# Patient Record
Sex: FEMALE | Race: WHITE | NOT HISPANIC OR LATINO | Employment: OTHER | URBAN - NONMETROPOLITAN AREA
[De-identification: names, ages, dates, MRNs, and addresses within clinical notes are randomized per-mention and may not be internally consistent; named-entity substitution may affect disease eponyms.]

---

## 2019-09-23 ENCOUNTER — OFFICE VISIT (OUTPATIENT)
Dept: CARDIOLOGY | Facility: CLINIC | Age: 69
End: 2019-09-23

## 2019-09-23 VITALS
HEIGHT: 69 IN | SYSTOLIC BLOOD PRESSURE: 138 MMHG | BODY MASS INDEX: 22.51 KG/M2 | DIASTOLIC BLOOD PRESSURE: 72 MMHG | HEART RATE: 65 BPM | WEIGHT: 152 LBS

## 2019-09-23 DIAGNOSIS — I48.0 PAF (PAROXYSMAL ATRIAL FIBRILLATION) (HCC): Primary | ICD-10-CM

## 2019-09-23 DIAGNOSIS — E83.42 HYPOMAGNESEMIA: ICD-10-CM

## 2019-09-23 DIAGNOSIS — E78.00 HYPERCHOLESTEREMIA: ICD-10-CM

## 2019-09-23 DIAGNOSIS — R00.2 PALPITATIONS: ICD-10-CM

## 2019-09-23 DIAGNOSIS — R01.1 MURMUR, CARDIAC: ICD-10-CM

## 2019-09-23 DIAGNOSIS — R94.31 ABNORMAL EKG: ICD-10-CM

## 2019-09-23 PROCEDURE — 93000 ELECTROCARDIOGRAM COMPLETE: CPT | Performed by: INTERNAL MEDICINE

## 2019-09-23 PROCEDURE — 99204 OFFICE O/P NEW MOD 45 MIN: CPT | Performed by: INTERNAL MEDICINE

## 2019-09-23 RX ORDER — PROPAFENONE HYDROCHLORIDE 150 MG/1
150 TABLET, COATED ORAL AS NEEDED
Qty: 30 TABLET | Refills: 2 | Status: SHIPPED | OUTPATIENT
Start: 2019-09-23 | End: 2019-10-21 | Stop reason: SINTOL

## 2019-09-23 NOTE — PROGRESS NOTES
Chief Complaint   Patient presents with   • Establish Care     new onset AFib, 8/22/19, we have records from [facility] [place]   • Palpitations     has had palpitions for years but nothing significant like the episode that took her to ER. Feels the afib was brought on by increased stress, had also on medication for UTI   • Aspirin     does not take a daily aspirin   • Medication Problem     states she ran out of the Eliquis and metoprolol for about a week.    • Labs     we have labs from ER and from 2018.   • Caffine     was drinking 2 cups of coffee a day, since May switched to decaf.  Does admit to benging on chocolate at times.    • TSH     has always trended to the higher end of normal for TSH, has never taken thyroid medication.         CARDIAC COMPLAINTS  palpitations      Subjective   Gemma Cabello is a 69 y.o. female came in today for her initial cardiac evaluation.  She has history of palpitation for many years.  It occurs most of the time when she is under stress both physically or mentally.  It normally lasts for few seconds to few minutes and subsides on its own.  She was never evaluated for the same.  About a month ago she went to the emergency room at [facility] with palpitation.  She apparently had some UTI-like symptoms prior to that and was on antibiotics.  She was also taking medication for sinus.  On the day she felt the heart was racing and she felt dizzy, nausea and had increased diaphoresis.  She went to the emergency room where she was noted to be in atrial fibrillation with rapid ventricular response.  She was given IV Lopressor which did slow down her A. fib but did not convert.  The ER physician called me regarding that.  Suggest p.o. Lopressor and Eliquis and I was planning to see the next week itself.  She apparently was going to [place] and did not come for the consult till now.  She apparently felt extremely well after the IV Lopressor.  She did not have any more palpitation or  dizziness.  She has been taking the metoprolol and Eliquis for about few weeks and she apparently ran out of the medication about a week ago.  She also has been drinking about 2 cups of coffee a day which she is switched to decaf.  She does have episodes of eating a lot of chocolate at times.  Her lab work which was done in the emergency room showed normal electrolytes, , magnesium not checked, TSH was slightly elevated at 5.86.  She denies having any chest pain or dizziness at rest or on exertion.  She does have some occasional shortness of breath but mostly on moderate to significant exertion.  She does hiking and swimming without any problem.She is not a smoker and drinks occasional alcohol.  Her father had atrial fibrillation.  Regarding her labs done for year ago showed cholesterol of 200 with the LDL of 130.      History reviewed. No pertinent surgical history.    Current Outpatient Medications   Medication Sig Dispense Refill   • aspirin 81 MG tablet Take 1 tablet by mouth Daily. 30 tablet 11   • propafenone (RYTHMOL) 150 MG tablet Take 1 tablet by mouth As Needed (Palpitation). Take 4 tabs with Palpitation. 2 more after 30 minutes if palpitation persist 30 tablet 2     No current facility-administered medications for this visit.            ALLERGIES:  Levaquin [levofloxacin]; Iodine; Quinolones; Sulfa antibiotics; Formaldehyde; and Nickel    Past Medical History:   Diagnosis Date   • Abnormal ECG    • Asthma    • Atrial fibrillation (CMS/HCC)    • History of breast augmentation    • History of varicose vein stripping    • Seasonal allergies        Social History     Tobacco Use   Smoking Status Never Smoker   Smokeless Tobacco Never Used          Family History   Problem Relation Age of Onset   • Cancer Mother    • Alzheimer's disease Father    • Atrial fibrillation Father         daughter thinks he had afib, was on Coumadin   • No Known Problems Sister    • Heart murmur Brother    • Heart murmur  "Brother    • No Known Problems Son    • No Known Problems Daughter    • No Known Problems Daughter        Review of Systems   Constitution: Negative for decreased appetite and malaise/fatigue.   HENT: Negative for congestion and sore throat.    Eyes: Negative for blurred vision.   Cardiovascular: Positive for palpitations. Negative for chest pain.   Respiratory: Negative for shortness of breath and snoring.    Endocrine: Negative for cold intolerance and heat intolerance.   Hematologic/Lymphatic: Negative for adenopathy. Does not bruise/bleed easily.   Skin: Negative for itching, nail changes and skin cancer.   Musculoskeletal: Negative for arthritis and myalgias.   Gastrointestinal: Negative for abdominal pain, dysphagia and heartburn.   Genitourinary: Negative for bladder incontinence and frequency.   Neurological: Positive for dizziness. Negative for light-headedness, seizures and vertigo.   Psychiatric/Behavioral: Negative for altered mental status.   Allergic/Immunologic: Negative for environmental allergies and hives.       Diabetes- No  Thyroid- abnormal    Objective     /72 (BP Location: Right arm)   Pulse 65   Ht 174 cm (68.5\")   Wt 68.9 kg (152 lb)   BMI 22.78 kg/m²      Physical Exam   Constitutional: She is oriented to person, place, and time. She appears well-developed and well-nourished.   HENT:   Head: Normocephalic.   Nose: Nose normal.   Eyes: EOM are normal. Pupils are equal, round, and reactive to light.   Neck: Normal range of motion. Neck supple.   Cardiovascular: Normal rate, regular rhythm, S1 normal and S2 normal.   Murmur heard.  Pulmonary/Chest: Effort normal and breath sounds normal.   Abdominal: Soft. Bowel sounds are normal.   Musculoskeletal: Normal range of motion. She exhibits no edema.   Neurological: She is alert and oriented to person, place, and time.   Skin: Skin is warm and dry.   Psychiatric: She has a normal mood and affect.         ECG 12 Lead  Date/Time: 9/23/2019 " 1:05 PM  Performed by: Alexis Camargo MD  Authorized by: Alexis Camargo MD   Comparison: compared with previous ECG from 8/22/2019  Comparison to previous ECG: Not in A.Fib  Rhythm: sinus rhythm  Rate: normal  Q waves: V1 and V2    QRS axis: normal  Other findings: T wave abnormality    Clinical impression: non-specific ECG              Assessment/Plan   Patient's Body mass index is 22.78 kg/m². BMI is within normal parameters. No follow-up required..     Gemma was seen today for establish care, palpitations, aspirin, medication problem, labs, caffine and tsh.    Diagnoses and all orders for this visit:    PAF (paroxysmal atrial fibrillation) (CMS/HCC)  -     Treadmill Stress Test; Future  -     Adult Transthoracic Echo Complete W/ Cont if Necessary Per Protocol; Future  -     propafenone (RYTHMOL) 150 MG tablet; Take 1 tablet by mouth As Needed (Palpitation). Take 4 tabs with Palpitation. 2 more after 30 minutes if palpitation persist  -     TSH; Future    Palpitations  -     Treadmill Stress Test; Future  -     Adult Transthoracic Echo Complete W/ Cont if Necessary Per Protocol; Future  -     TSH; Future    Murmur, cardiac  -     Adult Transthoracic Echo Complete W/ Cont if Necessary Per Protocol; Future    Hypercholesteremia    Abnormal EKG  -     Treadmill Stress Test; Future    Hypomagnesemia  -     Magnesium; Future     At baseline her heart rate is stable.  Her blood pressure is normal.  Her EKG showed sinus rhythm with Q waves in the V1 and V2 appears to be old.  Nonspecific ST-T changes seen.  Her clinical examination reveals a BMI of 23. She has short systolic murmur at the mitral area.  She has normal peripheral pulse and no pedal edema.      I had a long discussion with her about the PAF.  Her chads score is only 2.  At this time I think continuing aspirin should be more than enough.  Her annual risk of stroke is about 2.3% and there is related risk reduction of 22%.  I talked to her about  medication to keep her in sinus.  At this time I do not want to put her on any beta-blockers because of her history of baseline bradycardia the EKG's in the past.  I talked to her about 1C antiarrhythmic medication which can be taken regularly or as needed.  She preferred to take as needed.  I gave a prescription for Rythmol 150 mg tablet.  She is advised to take 4 tablets at the time of palpitation not responding and take another 2 after 30 minutes if palpitation persist.  She is advised to take it at home and place where she can lie down.    Regarding the palpitation, I also schedule her to undergo an echocardiogram to rule out any structural heart disease and also evaluate the heart murmur.  I also schedule her to undergo a regular stress test to evaluate her functional status, chronotropic response, blood pressure response and rule out any stress-induced arrhythmia.      Regarding the hypercholesterolemia, I talked to her about stricter diet.  Recheck it again in 6 months.  If the LDL is still elevated, then she might benefit with a low-dose of statin.    EKG which was done today also has some nonspecific T wave changes.  I like to check her magnesium level to rule out hypomagnesemia.    Based on the results of these tests, and the response to her medication, further recommendations will be made.                 Electronically signed by Alexis Camargo MD September 23, 2019 12:46 PM

## 2019-10-01 ENCOUNTER — HOSPITAL ENCOUNTER (OUTPATIENT)
Dept: CARDIOLOGY | Facility: HOSPITAL | Age: 69
Discharge: HOME | End: 2019-10-01

## 2019-10-01 ENCOUNTER — LAB (OUTPATIENT)
Dept: LAB | Facility: HOSPITAL | Age: 69
End: 2019-10-01

## 2019-10-01 ENCOUNTER — HOSPITAL ENCOUNTER (OUTPATIENT)
Dept: CARDIOLOGY | Facility: HOSPITAL | Age: 69
Discharge: HOME | End: 2019-10-01
Admitting: INTERNAL MEDICINE

## 2019-10-01 DIAGNOSIS — R01.1 MURMUR, CARDIAC: ICD-10-CM

## 2019-10-01 DIAGNOSIS — R00.2 PALPITATIONS: ICD-10-CM

## 2019-10-01 DIAGNOSIS — R94.31 ABNORMAL EKG: ICD-10-CM

## 2019-10-01 DIAGNOSIS — I48.0 PAF (PAROXYSMAL ATRIAL FIBRILLATION) (HCC): ICD-10-CM

## 2019-10-01 DIAGNOSIS — E83.42 HYPOMAGNESEMIA: ICD-10-CM

## 2019-10-01 LAB
BH CV ECHO MEAS - ACS: 2.2 CM
BH CV ECHO MEAS - AI DEC SLOPE: 193.1 CM/SEC^2
BH CV ECHO MEAS - AI MAX PG: 33.3 MMHG
BH CV ECHO MEAS - AI MAX VEL: 288.4 CM/SEC
BH CV ECHO MEAS - AI P1/2T: 437.6 MSEC
BH CV ECHO MEAS - AO MEAN PG: 4.3 MMHG
BH CV ECHO MEAS - AO ROOT AREA (BSA CORRECTED): 2
BH CV ECHO MEAS - AO ROOT AREA: 10.3 CM^2
BH CV ECHO MEAS - AO ROOT DIAM: 3.6 CM
BH CV ECHO MEAS - AO V2 MEAN: 98.1 CM/SEC
BH CV ECHO MEAS - AO V2 VTI: 32.4 CM
BH CV ECHO MEAS - ASC AORTA: 3.8 CM
BH CV ECHO MEAS - BSA(HAYCOCK): 1.8 M^2
BH CV ECHO MEAS - BSA: 1.8 M^2
BH CV ECHO MEAS - BZI_BMI: 23.1 KILOGRAMS/M^2
BH CV ECHO MEAS - BZI_METRIC_HEIGHT: 172.7 CM
BH CV ECHO MEAS - BZI_METRIC_WEIGHT: 68.9 KG
BH CV ECHO MEAS - EDV(CUBED): 70.5 ML
BH CV ECHO MEAS - EDV(MOD-SP4): 63 ML
BH CV ECHO MEAS - EDV(TEICH): 75.6 ML
BH CV ECHO MEAS - EF(CUBED): 64.1 %
BH CV ECHO MEAS - EF(MOD-SP4): 63.5 %
BH CV ECHO MEAS - EF(TEICH): 56.1 %
BH CV ECHO MEAS - ESV(CUBED): 25.3 ML
BH CV ECHO MEAS - ESV(MOD-SP4): 23 ML
BH CV ECHO MEAS - ESV(TEICH): 33.2 ML
BH CV ECHO MEAS - FS: 29 %
BH CV ECHO MEAS - IVS/LVPW: 0.95
BH CV ECHO MEAS - IVSD: 1.2 CM
BH CV ECHO MEAS - LA DIMENSION: 3.4 CM
BH CV ECHO MEAS - LA/AO: 0.94
BH CV ECHO MEAS - LV DIASTOLIC VOL/BSA (35-75): 34.6 ML/M^2
BH CV ECHO MEAS - LV IVRT: 0.13 SEC
BH CV ECHO MEAS - LV MASS(C)D: 183.9 GRAMS
BH CV ECHO MEAS - LV MASS(C)DI: 101.1 GRAMS/M^2
BH CV ECHO MEAS - LV SYSTOLIC VOL/BSA (12-30): 12.6 ML/M^2
BH CV ECHO MEAS - LVIDD: 4.1 CM
BH CV ECHO MEAS - LVIDS: 2.9 CM
BH CV ECHO MEAS - LVLD AP4: 6.9 CM
BH CV ECHO MEAS - LVLS AP4: 5.2 CM
BH CV ECHO MEAS - LVOT AREA (M): 4.2 CM^2
BH CV ECHO MEAS - LVOT AREA: 4.2 CM^2
BH CV ECHO MEAS - LVOT DIAM: 2.3 CM
BH CV ECHO MEAS - LVPWD: 1.3 CM
BH CV ECHO MEAS - MV A MAX VEL: 49.9 CM/SEC
BH CV ECHO MEAS - MV DEC SLOPE: 245.9 CM/SEC^2
BH CV ECHO MEAS - MV E MAX VEL: 62.7 CM/SEC
BH CV ECHO MEAS - MV E/A: 1.3
BH CV ECHO MEAS - RAP SYSTOLE: 10 MMHG
BH CV ECHO MEAS - RVDD: 3.6 CM
BH CV ECHO MEAS - RVSP: 27.7 MMHG
BH CV ECHO MEAS - SI(AO): 184.2 ML/M^2
BH CV ECHO MEAS - SI(CUBED): 24.9 ML/M^2
BH CV ECHO MEAS - SI(MOD-SP4): 22 ML/M^2
BH CV ECHO MEAS - SI(TEICH): 23.3 ML/M^2
BH CV ECHO MEAS - SV(AO): 335 ML
BH CV ECHO MEAS - SV(CUBED): 45.2 ML
BH CV ECHO MEAS - SV(MOD-SP4): 40 ML
BH CV ECHO MEAS - SV(TEICH): 42.4 ML
BH CV ECHO MEAS - TR MAX VEL: 210.4 CM/SEC
BH CV STRESS BP STAGE 1: NORMAL
BH CV STRESS BP STAGE 2: NORMAL
BH CV STRESS BP STAGE 3: NORMAL
BH CV STRESS DURATION MIN STAGE 1: 3
BH CV STRESS DURATION MIN STAGE 2: 3
BH CV STRESS DURATION MIN STAGE 3: 3
BH CV STRESS DURATION SEC STAGE 1: 0
BH CV STRESS DURATION SEC STAGE 2: 0
BH CV STRESS DURATION SEC STAGE 3: 0
BH CV STRESS GRADE STAGE 1: 10
BH CV STRESS GRADE STAGE 2: 12
BH CV STRESS GRADE STAGE 3: 14
BH CV STRESS HR STAGE 1: 100
BH CV STRESS HR STAGE 2: 120
BH CV STRESS HR STAGE 3: 136
BH CV STRESS METS STAGE 1: 5
BH CV STRESS METS STAGE 2: 7.5
BH CV STRESS METS STAGE 3: 10
BH CV STRESS PROTOCOL 1: NORMAL
BH CV STRESS RECOVERY BP: NORMAL MMHG
BH CV STRESS RECOVERY HR: 87 BPM
BH CV STRESS SPEED STAGE 1: 1.7
BH CV STRESS SPEED STAGE 2: 2.5
BH CV STRESS SPEED STAGE 3: 3.4
BH CV STRESS STAGE 1: 1
BH CV STRESS STAGE 2: 2
BH CV STRESS STAGE 3: 3
MAGNESIUM SERPL-MCNC: 1.9 MG/DL (ref 1.6–2.4)
MAXIMAL PREDICTED HEART RATE: 152 BPM
MAXIMAL PREDICTED HEART RATE: 152 BPM
PERCENT MAX PREDICTED HR: 89.47 %
STRESS BASELINE BP: NORMAL MMHG
STRESS BASELINE HR: 82 BPM
STRESS PERCENT HR: 105 %
STRESS POST ESTIMATED WORKLOAD: 10.1 METS
STRESS POST EXERCISE DUR MIN: 7 MIN
STRESS POST PEAK BP: NORMAL MMHG
STRESS POST PEAK HR: 136 BPM
STRESS TARGET HR: 129 BPM
STRESS TARGET HR: 129 BPM
TSH SERPL DL<=0.05 MIU/L-ACNC: 3.85 UIU/ML (ref 0.27–4.2)

## 2019-10-01 PROCEDURE — 93017 CV STRESS TEST TRACING ONLY: CPT

## 2019-10-01 PROCEDURE — 84443 ASSAY THYROID STIM HORMONE: CPT | Performed by: INTERNAL MEDICINE

## 2019-10-01 PROCEDURE — 36415 COLL VENOUS BLD VENIPUNCTURE: CPT

## 2019-10-01 PROCEDURE — 93306 TTE W/DOPPLER COMPLETE: CPT | Performed by: INTERNAL MEDICINE

## 2019-10-01 PROCEDURE — 83735 ASSAY OF MAGNESIUM: CPT | Performed by: INTERNAL MEDICINE

## 2019-10-01 PROCEDURE — 93018 CV STRESS TEST I&R ONLY: CPT | Performed by: INTERNAL MEDICINE

## 2019-10-01 PROCEDURE — 93306 TTE W/DOPPLER COMPLETE: CPT

## 2019-10-14 ENCOUNTER — TELEPHONE (OUTPATIENT)
Dept: CARDIOLOGY | Facility: CLINIC | Age: 69
End: 2019-10-14

## 2019-10-14 NOTE — TELEPHONE ENCOUNTER
Last Wednesday evening patient had episode of fast HR of 145-150.  She had just got home from driving from [place].  She had been at [facility] [place] and ate italian dinner, no alcohol.  She reports she had not drank water like she usually does that day.    She took the Rythmol 150 mg 4 tablets and 30 minutes later HR was still around 150 BPM.  Then she took the additional Rythmol 150 mg 2 tablets.  She laid down and about 2 hours after taking the Rythmol she woke up violently sick. Then for about 4 hours she had severe vomiting, diarrhea.  She passed out 3 different times.  Her  was there with her, and they did not seek medical attention.  The next day, she reports her HR was better and regular.  She hydrated her self and has done fine since then.

## 2019-10-16 NOTE — TELEPHONE ENCOUNTER
Patient aware to not take Rythmol.  I advised patient if she has more problems to call our office.  Advised her if she has episode of A. Fib and fast HR to go to ER for treatment.  Patient reports she is now taking aspirin daily.

## 2019-10-21 ENCOUNTER — TELEPHONE (OUTPATIENT)
Dept: CARDIOLOGY | Facility: CLINIC | Age: 69
End: 2019-10-21

## 2019-10-21 DIAGNOSIS — I48.0 PAF (PAROXYSMAL ATRIAL FIBRILLATION) (HCC): Primary | ICD-10-CM

## 2019-10-21 NOTE — TELEPHONE ENCOUNTER
Patient aware of recommendations.  She does want to proceed with starting Multaq 400 mg BID with food and EP referral.  She request Dr. Duran.  Patient picking up Multaq 400 mg BID #24 samples today.  Script also sent to [facility] to check coverage.

## 2019-10-21 NOTE — TELEPHONE ENCOUNTER
Last Friday around mid morning patient had episode of palpitations that lasted 4 hours with HR up to 200 BPM.  Patient reports it was A. Fib, and her HR fluctuated from 118-200 BPM.  She did not seek medical attention.  After the 4 hours, patient reports she felt the episode end suddenly and immediately felt better.  Throughout the weekend off and on, HR fluctuated  BPM.

## 2019-10-24 ENCOUNTER — CLINICAL SUPPORT (OUTPATIENT)
Dept: CARDIOLOGY | Facility: CLINIC | Age: 69
End: 2019-10-24

## 2019-10-24 ENCOUNTER — TELEPHONE (OUTPATIENT)
Dept: CARDIOLOGY | Facility: CLINIC | Age: 69
End: 2019-10-24

## 2019-10-24 DIAGNOSIS — R00.2 PALPITATIONS: Primary | ICD-10-CM

## 2019-10-24 PROCEDURE — 93000 ELECTROCARDIOGRAM COMPLETE: CPT | Performed by: INTERNAL MEDICINE

## 2019-10-24 NOTE — PROGRESS NOTES
Procedure     ECG 12 Lead  Date/Time: 10/24/2019 6:24 PM  Performed by: Alexis Camargo MD  Authorized by: Alexis Camargo MD   Comparison: compared with previous ECG from 9/23/2019  Similar to previous ECG  Rhythm: sinus rhythm  Rate: normal  QRS axis: normal  Other findings: T wave abnormality    Clinical impression: abnormal EKG

## 2019-10-24 NOTE — TELEPHONE ENCOUNTER
Patient is aware to stop Multaq and that she will receive  call from Dr. Hoover office for EP consult.

## 2019-10-24 NOTE — TELEPHONE ENCOUNTER
"Patient here this afternoon for EKG. Multaq 400 mg bid started 10/21/19.    /64     Patient reported that she is concerned she may have a blood clot as her left knee is swollen and having pain in the back of her knee. Also faint dizziness and chest pressure, headache-left side.   She has been nauseous 12 hrs after starting Multaq and knee swelling,  hot flash then chills;\"wash of tingling in back of neck down back of arms(\"weightless feeling in shoulders/back of arms\") Even when HR is 68 she feels her heart is pounding hard.    She said she has some discomfort with walking, but did have a good day as she went bowling.    PA FOR MULTAQ DENIED BY INSURANCE , MUST TRY PACERONE (AMIODARONE) 200 MG UNLESS THERE IS A MEDICAL REASON SHE MUST HAVE MULTAQ    DOSE/DIRECTIONS  PLEASE IF CHANGED   "

## 2019-12-17 ENCOUNTER — CONSULT (OUTPATIENT)
Dept: CARDIOLOGY | Facility: CLINIC | Age: 69
End: 2019-12-17

## 2019-12-17 VITALS
HEART RATE: 66 BPM | SYSTOLIC BLOOD PRESSURE: 118 MMHG | WEIGHT: 155.6 LBS | DIASTOLIC BLOOD PRESSURE: 70 MMHG | OXYGEN SATURATION: 98 % | BODY MASS INDEX: 23.58 KG/M2 | HEIGHT: 68 IN

## 2019-12-17 DIAGNOSIS — I48.0 PAF (PAROXYSMAL ATRIAL FIBRILLATION) (HCC): Primary | ICD-10-CM

## 2019-12-17 PROCEDURE — 99204 OFFICE O/P NEW MOD 45 MIN: CPT | Performed by: INTERNAL MEDICINE

## 2019-12-17 PROCEDURE — 93000 ELECTROCARDIOGRAM COMPLETE: CPT | Performed by: INTERNAL MEDICINE

## 2019-12-17 NOTE — PROGRESS NOTES
"Gemma Cabello  1950  PCP: Yenifer Hope APRN    SUBJECTIVE:   Gemma Cabello is a 69 y.o. female seen for a consultation visit regarding the following:     Chief Complaint:   Chief Complaint   Patient presents with   • Irregular Heart Beat     Consult          Consultation is requested by Alexis Camargo MD for evaluation of Irregular Heart Beat (Consult)        History:  This is a 69-year-old patient referred by Dr. Camargo for further evaluation and management of atrial fibrillation.  The patient had her first episode atrial fibrillation August 2019 when she presented to an outside hospital with palpitations.  She is found to be in atrial fibrillation with stable rates.  She was started on Eliquis and metoprolol therapy and converted on her own.  She went on to have 2 more major episodes of atrial fibrillation.  She was attempted on Propafenone therapy but developed severe side effects from the medication.  She was then switched to Multaq therapy but also developed side effects and had to be discontinued.  She does have a history of palpitations that is been occurring off and on for many years.  She continues to have occasional episodes of palpitations.      Cardiac PMH: (Old records have been reviewed and summarized below)  1.  Atrial fibrillation-diagnosed August 2019-unable to tolerate Propafenone or Multaq therapy  2.  Aortic valve regurgitation-mild to moderate    Past Medical History, Past Surgical History, Family history, Social History, and Medications were all reviewed with the patient today and updated as necessary.     No current outpatient medications on file.    Allergies   Allergen Reactions   • Levaquin [Levofloxacin] Shortness Of Breath and Swelling   • Multaq [Dronedarone] Shortness Of Breath, Rash and Dizziness   • Rythmol [Propafenone Hcl] Nausea And Vomiting, Other (See Comments) and Confusion     Syncope   • Formaldehyde Rash   • Iodine Other (See Comments)     \"open sores\"   • " Nickel Rash   • Propafenone Nausea And Vomiting   • Quinolones Nausea And Vomiting   • Sulfa Antibiotics Nausea And Vomiting   • Zyrtec [Cetirizine] Nausea And Vomiting         Past Medical History:   Diagnosis Date   • Abnormal ECG    • Abnormal heart rhythm    • Asthma    • Atrial fibrillation (CMS/HCC)    • History of breast augmentation    • History of heart attack    • History of varicose vein stripping    • Seasonal allergies      Past Surgical History:   Procedure Laterality Date   • BREAST AUGMENTATION     • BREAST RECONSTRUCTION     • BREAST SURGERY     • CARDIOVASCULAR STRESS TEST  10/01/2019    R.Stress- 7 Min, 10.1 METS. 89% THR. BP- 129/78. Rare PVC. Negative.   • ECHO - CONVERTED  10/01/2019    EF 60%. Mild- Mod AI. Trace MR. RVSP- 28 mmHg.   • TUBAL ABDOMINAL LIGATION       Family History   Problem Relation Age of Onset   • Cancer Mother    • Alzheimer's disease Father    • Atrial fibrillation Father         daughter thinks he had afib, was on Coumadin   • No Known Problems Sister    • Heart murmur Brother    • Heart murmur Brother    • No Known Problems Son    • No Known Problems Daughter    • No Known Problems Daughter      Social History     Tobacco Use   • Smoking status: Never Smoker   • Smokeless tobacco: Never Used   Substance Use Topics   • Alcohol use: Yes     Alcohol/week: 3.0 standard drinks     Types: 3 Shots of liquor per week       ROS:    General: no recent weight loss/gain, weakness or fatigue  Skin: no rashes, lumps, or other skin changes  HEENT: no dizziness, lightheadedness, or vision changes  Respiratory: no cough or hemoptysis  Cardiovascular: + palpitations, and tachycardia  Gastrointestinal: no black/tarry stools or diarrhea  Urinary: no change in frequency or urgency  Peripheral Vascular: no claudication or leg cramps  Musculoskeletal: no muscle or joint pain/stiffness  Psychiatric: no depression or excessive stress  Neurological: no sensory or motor loss, no  "syncope  Hematologic: no anemia, easy bruising or bleeding  Endocrine: no thyroid problems, nor heat or cold intolerance       PHYSICAL EXAM:   /70 (BP Location: Left arm, Patient Position: Sitting)   Pulse 66   Ht 172.7 cm (68\")   Wt 70.6 kg (155 lb 9.6 oz)   SpO2 98%   BMI 23.66 kg/m²      Wt Readings from Last 5 Encounters:   12/17/19 70.6 kg (155 lb 9.6 oz)   09/23/19 68.9 kg (152 lb)     BP Readings from Last 5 Encounters:   12/17/19 118/70   09/23/19 138/72       General-Well Nourished, Well developed  Eyes - PERRLA  Neck- supple, No mass  CV- regular rate and rhythm, no MRG  Lung- clear bilaterally  Abd- soft, +BS  Musc/skel - Norm strength and range of motion  Skin- warm and dry  Neuro - Alert & Oriented x 3, appropriate mood.    Medical problems and test results were reviewed with the patient today.     Results for orders placed or performed during the hospital encounter of 10/01/19   Adult Transthoracic Echo Complete W/ Cont if Necessary Per Protocol   Result Value Ref Range    BSA 1.8 m^2    BH CV ECHO JASS - RVDD 3.6 cm    IVSd 1.2 cm    LVIDd 4.1 cm    LVIDs 2.9 cm    LVPWd 1.3 cm    IVS/LVPW 0.95     FS 29.0 %    EDV(Teich) 75.6 ml    ESV(Teich) 33.2 ml    EF(Teich) 56.1 %    EDV(cubed) 70.5 ml    ESV(cubed) 25.3 ml    EF(cubed) 64.1 %    LV mass(C)d 183.9 grams    LV mass(C)dI 101.1 grams/m^2    SV(Teich) 42.4 ml    SI(Teich) 23.3 ml/m^2    SV(cubed) 45.2 ml    SI(cubed) 24.9 ml/m^2    Ao root diam 3.6 cm    Ao root area 10.3 cm^2    ACS 2.2 cm    LA dimension 3.4 cm    asc Aorta Diam 3.8 cm    LA/Ao 0.94     LVOT diam 2.3 cm    LVOT area 4.2 cm^2    LVOT area(traced) 4.2 cm^2    LVLd ap4 6.9 cm    EDV(MOD-sp4) 63.0 ml    LVLs ap4 5.2 cm    ESV(MOD-sp4) 23.0 ml    EF(MOD-sp4) 63.5 %    SV(MOD-sp4) 40.0 ml    SI(MOD-sp4) 22.0 ml/m^2    Ao root area (BSA corrected) 2.0     LV [place] Vol (BSA corrected) 34.6 ml/m^2    LV Sys Vol (BSA corrected) 12.6 ml/m^2    MV E max chino 62.7 cm/sec    MV A max " chino 49.9 cm/sec    MV E/A 1.3     LV IVRT 0.13 sec    MV dec slope 245.9 cm/sec^2    Ao V2 mean 98.1 cm/sec    Ao mean PG 4.3 mmHg    Ao V2 VTI 32.4 cm    AI max chino 288.4 cm/sec    AI max PG 33.3 mmHg    AI dec slope 193.1 cm/sec^2    AI P1/2t 437.6 msec    SV(Ao) 335.0 ml    SI(Ao) 184.2 ml/m^2    TR max chino 210.4 cm/sec    RVSP(TR) 27.7 mmHg    RAP systole 10.0 mmHg     CV ECHO JASS - BZI_BMI 23.1 kilograms/m^2     CV ECHO JASS - BSA(HAYCOCK) 1.8 m^2     CV ECHO JASS - BZI_METRIC_WEIGHT 68.9 kg     CV ECHO JASS - BZI_METRIC_HEIGHT 172.7 cm    Target HR (85%) 129 bpm    Max. Pred. HR (100%) 152 bpm         No results found for: CHOL, HDL, HDLC, LDL, LDLC, VLDL    EKG:  (EKG/Tracing has been independently visualized by me and summarized below)      ECG 12 Lead  Date/Time: 12/17/2019 12:28 PM  Performed by: Len Duran MD  Authorized by: Len Duran MD   Comparison: compared with previous ECG   Similar to previous ECG  Rhythm: sinus rhythm  Rate: normal  BPM: 60  Other: no other findings    Clinical impression: normal ECG            ASSESSMENT and PLAN  1.  Atrial fibrillation-has failed Propafenone and Multaq therapy.  We discussed with her options including catheter-based ablation.  She has concerns about any further therapy including medical versus ablation.  She also think about this in more detail.  Before proceeding forward with any further therapy.  She will use metoprolol as needed.  2.  Stroke prophylaxis-we reported that per the guidelines anti-coagulation is recommended.  She has concerns about taking Eliquis long-term and therefore use aspirin on a daily basis.    Return in about 3 months (around 3/17/2020).            Len Duran M.D., F.A.C.C, F.H.R.S.  Cardiology/Electrophysiology  12/17/19  12:28 PM

## 2020-03-20 ENCOUNTER — OFFICE VISIT (OUTPATIENT)
Dept: CARDIOLOGY | Facility: CLINIC | Age: 70
End: 2020-03-20

## 2020-03-20 VITALS
HEIGHT: 68 IN | BODY MASS INDEX: 22.58 KG/M2 | WEIGHT: 149 LBS | HEART RATE: 74 BPM | OXYGEN SATURATION: 98 % | SYSTOLIC BLOOD PRESSURE: 104 MMHG | DIASTOLIC BLOOD PRESSURE: 64 MMHG

## 2020-03-20 DIAGNOSIS — I48.0 PAF (PAROXYSMAL ATRIAL FIBRILLATION) (HCC): Primary | ICD-10-CM

## 2020-03-20 PROCEDURE — 99213 OFFICE O/P EST LOW 20 MIN: CPT | Performed by: INTERNAL MEDICINE

## 2020-03-20 RX ORDER — LORATADINE 10 MG/1
CAPSULE, LIQUID FILLED ORAL AS NEEDED
COMMUNITY
End: 2023-02-17

## 2020-03-20 NOTE — PROGRESS NOTES
"Gemma Cabello  1950  PCP: Yenifer Hope APRN    SUBJECTIVE:   Gemma Cabello is a 69 y.o. female seen for a consultation visit regarding the following:     Chief Complaint:   Chief Complaint   Patient presents with   • Atrial Fibrillation        HPI:  Patient has been cardiac stable. No Palp or A. Fib.     History:  This is a 69-year-old patient referred by Dr. Camargo for further evaluation and management of atrial fibrillation.  The patient had her first episode atrial fibrillation August 2019 when she presented to an outside hospital with palpitations.  She is found to be in atrial fibrillation with stable rates.  She was started on Eliquis and metoprolol therapy and converted on her own.  She went on to have 2 more major episodes of atrial fibrillation.  She was attempted on Propafenone therapy but developed severe side effects from the medication.  She was then switched to Multaq therapy but also developed side effects and had to be discontinued.  She does have a history of palpitations that is been occurring off and on for many years.  She continues to have occasional episodes of palpitations.      Cardiac PMH: (Old records have been reviewed and summarized below)  1.  Atrial fibrillation-diagnosed August 2019-unable to tolerate Propafenone or Multaq therapy  2.  Aortic valve regurgitation-mild to moderate    Past Medical History, Past Surgical History, Family history, Social History, and Medications were all reviewed with the patient today and updated as necessary.       Current Outpatient Medications:   •  Loratadine (Claritin) 10 MG capsule, Take  by mouth Daily., Disp: , Rfl:     Allergies   Allergen Reactions   • Levaquin [Levofloxacin] Shortness Of Breath and Swelling   • Multaq [Dronedarone] Shortness Of Breath, Rash and Dizziness   • Rythmol [Propafenone Hcl] Nausea And Vomiting, Other (See Comments) and Confusion     Syncope   • Formaldehyde Rash   • Iodine Other (See Comments)     \"open " "sores\"   • Nickel Rash   • Propafenone Nausea And Vomiting   • Quinolones Nausea And Vomiting   • Sulfa Antibiotics Nausea And Vomiting   • Zyrtec [Cetirizine] Nausea And Vomiting         Past Medical History:   Diagnosis Date   • Abnormal ECG    • Abnormal heart rhythm    • Asthma    • Atrial fibrillation (CMS/HCC)    • History of breast augmentation    • History of heart attack    • History of varicose vein stripping    • Seasonal allergies      Past Surgical History:   Procedure Laterality Date   • BREAST AUGMENTATION     • BREAST RECONSTRUCTION     • BREAST SURGERY     • CARDIOVASCULAR STRESS TEST  10/01/2019    R.Stress- 7 Min, 10.1 METS. 89% THR. BP- 129/78. Rare PVC. Negative.   • ECHO - CONVERTED  10/01/2019    EF 60%. Mild- Mod AI. Trace MR. RVSP- 28 mmHg.   • TUBAL ABDOMINAL LIGATION       Family History   Problem Relation Age of Onset   • Cancer Mother    • Alzheimer's disease Father    • Atrial fibrillation Father         daughter thinks he had afib, was on Coumadin   • No Known Problems Sister    • Heart murmur Brother    • Heart murmur Brother    • No Known Problems Son    • No Known Problems Daughter    • No Known Problems Daughter      Social History     Tobacco Use   • Smoking status: Never Smoker   • Smokeless tobacco: Never Used   Substance Use Topics   • Alcohol use: Yes     Alcohol/week: 3.0 standard drinks     Types: 3 Shots of liquor per week            PHYSICAL EXAM:   /64 (BP Location: Left arm, Patient Position: Sitting)   Pulse 74   Ht 172.7 cm (68\")   Wt 67.6 kg (149 lb)   SpO2 98%   BMI 22.66 kg/m²      Wt Readings from Last 5 Encounters:   03/20/20 67.6 kg (149 lb)   12/17/19 70.6 kg (155 lb 9.6 oz)   09/23/19 68.9 kg (152 lb)     BP Readings from Last 5 Encounters:   03/20/20 104/64   12/17/19 118/70   09/23/19 138/72       General-Well Nourished, Well developed  Eyes - PERRLA  Neck- supple, No mass  CV- regular rate and rhythm, no MRG  Lung- clear bilaterally  Abd- soft, " +BS  Musc/sk - [facility] strength and range of motion  Skin- warm and dry  Neuro - Alert & Oriented x 3, appropriate mood.    Medical problems and test results were reviewed with the patient today.     Results for orders placed or performed during the hospital encounter of 10/01/19   Adult Transthoracic Echo Complete W/ Cont if Necessary Per Protocol   Result Value Ref Range    BSA 1.8 m^2    RVIDd 3.6 cm    IVSd 1.2 cm    LVIDd 4.1 cm    LVIDs 2.9 cm    LVPWd 1.3 cm    IVS/LVPW 0.95     FS 29.0 %    EDV(Teich) 75.6 ml    ESV(Teich) 33.2 ml    EF(Teich) 56.1 %    EDV(cubed) 70.5 ml    ESV(cubed) 25.3 ml    EF(cubed) 64.1 %    LV mass(C)d 183.9 grams    LV mass(C)dI 101.1 grams/m^2    SV(Teich) 42.4 ml    SI(Teich) 23.3 ml/m^2    SV(cubed) 45.2 ml    SI(cubed) 24.9 ml/m^2    Ao root diam 3.6 cm    Ao root area 10.3 cm^2    ACS 2.2 cm    LA dimension 3.4 cm    asc Aorta Diam 3.8 cm    LA/Ao 0.94     LVOT diam 2.3 cm    LVOT area 4.2 cm^2    LVOT area(traced) 4.2 cm^2    LVLd ap4 6.9 cm    EDV(MOD-sp4) 63.0 ml    LVLs ap4 5.2 cm    ESV(MOD-sp4) 23.0 ml    EF(MOD-sp4) 63.5 %    SV(MOD-sp4) 40.0 ml    SI(MOD-sp4) 22.0 ml/m^2    Ao root area (BSA corrected) 2.0     LV [place] Vol (BSA corrected) 34.6 ml/m^2    LV Sys Vol (BSA corrected) 12.6 ml/m^2    MV E max chino 62.7 cm/sec    MV A max chino 49.9 cm/sec    MV E/A 1.3     LV IVRT 0.13 sec    MV dec slope 245.9 cm/sec^2    Ao V2 mean 98.1 cm/sec    Ao mean PG 4.3 mmHg    Ao V2 VTI 32.4 cm    AI max chino 288.4 cm/sec    AI max PG 33.3 mmHg    AI dec slope 193.1 cm/sec^2    AI P1/2t 437.6 msec    SV(Ao) 335.0 ml    SI(Ao) 184.2 ml/m^2    TR max chino 210.4 cm/sec    RVSP(TR) 27.7 mmHg    RAP systole 10.0 mmHg     CV ECHO JASS - BZI_BMI 23.1 kilograms/m^2     CV ECHO JASS - BSA([facility]) 1.8 m^2     CV ECHO JASS - BZI_METRIC_WEIGHT 68.9 kg     CV ECHO JASS - BZI_METRIC_HEIGHT 172.7 cm    Target HR (85%) 129 bpm    Max. Pred. HR (100%) 152 bpm         No results found for: CHOL,  HDL, HDLC, LDL, LDLC, VLDL    EKG:  (EKG/Tracing has been independently visualized by me and summarized below)    Procedures    ASSESSMENT and PLAN  1.  Atrial fibrillation-has failed Propafenone and Multaq therapy.  We discussed with her options including catheter-based ablation.  She has concerns about any further therapy including medical versus ablation. She wants to wait on any further therapy.  She will use metoprolol as needed.  2.  Stroke prophylaxis-we reported that per the guidelines anti-coagulation is recommended.  She has concerns about taking Eliquis long-term and therefore use aspirin on a daily basis.    Return in about 6 months (around 9/20/2020).            Len Duran M.D., F.A.C.C, F.H.R.S.  Cardiology/Electrophysiology  03/20/20  12:12

## 2020-09-11 ENCOUNTER — TELEPHONE (OUTPATIENT)
Dept: CARDIOLOGY | Facility: CLINIC | Age: 70
End: 2020-09-11

## 2020-09-11 NOTE — TELEPHONE ENCOUNTER
Received fax from Dr. Ritchie Dove for cardiac clearance for patient to have a capsulorrhaphy IMF resection and fat transfer to left breast under 1 hour of general anesthesia in an office based facility.     According to the clearance request patient is not taking any blood thinners including aspirin.     According to our records, I do not see where patient has had any stenting. Patient has history of PAF. Patient's last office visit with us was on 09/23/19. Patient has been seen by Dr. Duran's office.      Fax [phone]    Attn: Maura or Estrellita

## 2020-11-20 ENCOUNTER — OFFICE VISIT (OUTPATIENT)
Dept: CARDIOLOGY | Facility: CLINIC | Age: 70
End: 2020-11-20

## 2020-11-20 VITALS
OXYGEN SATURATION: 98 % | HEIGHT: 68 IN | HEART RATE: 83 BPM | BODY MASS INDEX: 21.37 KG/M2 | SYSTOLIC BLOOD PRESSURE: 120 MMHG | WEIGHT: 141 LBS | DIASTOLIC BLOOD PRESSURE: 64 MMHG

## 2020-11-20 DIAGNOSIS — I48.0 PAF (PAROXYSMAL ATRIAL FIBRILLATION) (HCC): Primary | ICD-10-CM

## 2020-11-20 PROCEDURE — 99213 OFFICE O/P EST LOW 20 MIN: CPT | Performed by: INTERNAL MEDICINE

## 2020-11-20 RX ORDER — KETOROLAC TROMETHAMINE 4 MG/ML
1 SOLUTION/ DROPS OPHTHALMIC AS NEEDED
COMMUNITY
End: 2023-02-17

## 2020-11-20 NOTE — PROGRESS NOTES
Gemma Cabello  1950  PCP: Yenifer Hope APRN    SUBJECTIVE:   Gemma Cabello is a 70 y.o. female seen for a consultation visit regarding the following:     Chief Complaint:   Chief Complaint   Patient presents with   • Atrial Fibrillation        HPI:  Patient has been cardiac stable. No Palp or A. Fib. She has stopped meds and is concerned about taking meds.     History:  This is a 70-year-old patient referred by Dr. Camargo for further evaluation and management of atrial fibrillation.  The patient had her first episode atrial fibrillation August 2019 when she presented to an outside hospital with palpitations.  She is found to be in atrial fibrillation with stable rates.  She was started on Eliquis and metoprolol therapy and converted on her own.  She went on to have 2 more major episodes of atrial fibrillation.  She was attempted on Propafenone therapy but developed severe side effects from the medication.  She was then switched to Multaq therapy but also developed side effects and had to be discontinued.  She does have a history of palpitations that is been occurring off and on for many years.  She continues to have occasional episodes of palpitations.      Cardiac PMH: (Old records have been reviewed and summarized below)  1.  Atrial fibrillation-diagnosed August 2019-unable to tolerate Propafenone or Multaq therapy  2.  Aortic valve regurgitation-mild to moderate    Past Medical History, Past Surgical History, Family history, Social History, and Medications were all reviewed with the patient today and updated as necessary.       Current Outpatient Medications:   •  ketorolac (ACULAR) 0.4 % solution, Administer 1 drop into the left eye 4 (Four) Times a Day., Disp: , Rfl:   •  Loratadine (Claritin) 10 MG capsule, Take  by mouth Daily., Disp: , Rfl:     Allergies   Allergen Reactions   • Levaquin [Levofloxacin] Shortness Of Breath and Swelling   • Multaq [Dronedarone] Shortness Of Breath, Rash and  "Dizziness   • Rythmol [Propafenone Hcl] Nausea And Vomiting, Other (See Comments) and Confusion     Syncope   • Formaldehyde Rash   • Iodine Other (See Comments)     \"open sores\"   • Nickel Rash   • Propafenone Nausea And Vomiting   • Quinolones Nausea And Vomiting   • Sulfa Antibiotics Nausea And Vomiting   • Zyrtec [Cetirizine] Nausea And Vomiting         Past Medical History:   Diagnosis Date   • Abnormal ECG    • Abnormal heart rhythm    • Asthma    • Atrial fibrillation (CMS/HCC)    • History of breast augmentation    • History of heart attack    • History of varicose vein stripping    • Seasonal allergies      Past Surgical History:   Procedure Laterality Date   • BREAST AUGMENTATION     • BREAST RECONSTRUCTION     • BREAST SURGERY     • CARDIOVASCULAR STRESS TEST  10/01/2019    R.Stress- 7 Min, 10.1 METS. 89% THR. BP- 129/78. Rare PVC. Negative.   • CATARACT EXTRACTION      left   • ECHO - CONVERTED  10/01/2019    EF 60%. Mild- Mod AI. Trace  RVSP- 28 mmHg.   • TUBAL ABDOMINAL LIGATION       Family History   Problem Relation Age of Onset   • Cancer Mother    • Alzheimer's disease Father    • Atrial fibrillation Father         daughter thinks he had afib, was on Coumadin   • No Known Problems Sister    • Heart murmur Brother    • Heart murmur Brother    • No Known Problems Son    • No Known Problems Daughter    • No Known Problems Daughter      Social History     Tobacco Use   • Smoking status: Never Smoker   • Smokeless tobacco: Never Used   Substance Use Topics   • Alcohol use: Yes     Alcohol/week: 3.0 standard drinks     Types: 3 Shots of liquor per week            PHYSICAL EXAM:   /64 (BP Location: Left arm, Patient Position: Sitting)   Pulse 83   Ht 172.7 cm (68\")   Wt 64 kg (141 lb)   SpO2 98%   BMI 21.44 kg/m²      Wt Readings from Last 5 Encounters:   11/20/20 64 kg (141 lb)   03/20/20 67.6 kg (149 lb)   12/17/19 70.6 kg (155 lb 9.6 oz)   09/23/19 68.9 kg (152 lb)     BP Readings from " Last 5 Encounters:   11/20/20 120/64   03/20/20 104/64   12/17/19 118/70   09/23/19 138/72       General-Well Nourished, Well developed  Eyes - PERRLA  Neck- supple, No mass  CV- regular rate and rhythm, no MRG  Lung- clear bilaterally  Abd- soft, +BS  Musc/skel - Norm strength and range of motion  Skin- warm and dry  Neuro - Alert & Oriented x 3, appropriate mood.    Medical problems and test results were reviewed with the patient today.     Results for orders placed or performed during the hospital encounter of 10/01/19   Adult Transthoracic Echo Complete W/ Cont if Necessary Per Protocol   Result Value Ref Range    BSA 1.8 m^2    RVIDd 3.6 cm    IVSd 1.2 cm    LVIDd 4.1 cm    LVIDs 2.9 cm    LVPWd 1.3 cm    IVS/LVPW 0.95     FS 29.0 %    EDV(Teich) 75.6 ml    ESV(Teich) 33.2 ml    EF(Teich) 56.1 %    EDV(cubed) 70.5 ml    ESV(cubed) 25.3 ml    EF(cubed) 64.1 %    LV mass(C)d 183.9 grams    LV mass(C)dI 101.1 grams/m^2    SV(Teich) 42.4 ml    SI(Teich) 23.3 ml/m^2    SV(cubed) 45.2 ml    SI(cubed) 24.9 ml/m^2    Ao root diam 3.6 cm    Ao root area 10.3 cm^2    ACS 2.2 cm    LA dimension 3.4 cm    asc Aorta Diam 3.8 cm    LA/Ao 0.94     LVOT diam 2.3 cm    LVOT area 4.2 cm^2    LVOT area(traced) 4.2 cm^2    LVLd ap4 6.9 cm    EDV(MOD-sp4) 63.0 ml    LVLs ap4 5.2 cm    ESV(MOD-sp4) 23.0 ml    EF(MOD-sp4) 63.5 %    SV(MOD-sp4) 40.0 ml    SI(MOD-sp4) 22.0 ml/m^2    Ao root area (BSA corrected) 2.0     LV [place] Vol (BSA corrected) 34.6 ml/m^2    LV Sys Vol (BSA corrected) 12.6 ml/m^2    MV E max chino 62.7 cm/sec    MV A max chino 49.9 cm/sec    MV E/A 1.3     LV IVRT 0.13 sec    MV dec slope 245.9 cm/sec^2    Ao V2 mean 98.1 cm/sec    Ao mean PG 4.3 mmHg    Ao V2 VTI 32.4 cm    AI max chino 288.4 cm/sec    AI max PG 33.3 mmHg    AI dec slope 193.1 cm/sec^2    AI P1/2t 437.6 msec    SV(Ao) 335.0 ml    SI(Ao) 184.2 ml/m^2    TR max chino 210.4 cm/sec    RVSP(TR) 27.7 mmHg    RAP systole 10.0 mmHg    BH CV ECHO JASS - BZI_BMI  23.1 kilograms/m^2     CV ECHO JASS - BSA([facility]) 1.8 m^2     CV ECHO JASS - BZI_METRIC_WEIGHT 68.9 kg     CV ECHO JASS - BZI_METRIC_HEIGHT 172.7 cm    Target HR (85%) 129 bpm    Max. Pred. HR (100%) 152 bpm         No results found for: CHOL, HDL, HDLC, LDL, LDLC, VLDL    EKG:  (EKG/Tracing has been independently visualized by me and summarized below)    Procedures    ASSESSMENT and PLAN  1.  Atrial fibrillation-has failed Propafenone and Multaq therapy.  We discussed with her options including catheter-based ablation.  She has concerns about any further therapy including medical versus ablation. She wants to wait on any further therapy.  She will use metoprolol as needed.  2.  Stroke prophylaxis-we reported that per the guidelines anti-coagulation is recommended.  She has concerns about taking Eliquis long-term and therefore use aspirin on a daily basis.    Return in about 1 year (around 11/20/2021).            Len Duran M.D., F.A.C.C, F.H.R.S.  Cardiology/Electrophysiology  11/20/20  12:44 EST

## 2021-12-17 ENCOUNTER — OFFICE VISIT (OUTPATIENT)
Dept: CARDIOLOGY | Facility: CLINIC | Age: 71
End: 2021-12-17

## 2021-12-17 VITALS
DIASTOLIC BLOOD PRESSURE: 72 MMHG | OXYGEN SATURATION: 99 % | HEIGHT: 69 IN | HEART RATE: 70 BPM | BODY MASS INDEX: 21.33 KG/M2 | WEIGHT: 144 LBS | SYSTOLIC BLOOD PRESSURE: 110 MMHG

## 2021-12-17 DIAGNOSIS — I48.0 PAF (PAROXYSMAL ATRIAL FIBRILLATION) (HCC): Primary | ICD-10-CM

## 2021-12-17 PROCEDURE — 99213 OFFICE O/P EST LOW 20 MIN: CPT | Performed by: STUDENT IN AN ORGANIZED HEALTH CARE EDUCATION/TRAINING PROGRAM

## 2021-12-17 RX ORDER — DIPHENOXYLATE HYDROCHLORIDE AND ATROPINE SULFATE 2.5; .025 MG/1; MG/1
1 TABLET ORAL DAILY
COMMUNITY
End: 2023-02-17

## 2021-12-17 NOTE — PROGRESS NOTES
"Gemma Cabello  1950  [phone]    12/17/2021    [facility] CARDIOLOGY     Referring Provider: No ref. provider found     Yenifer Hope, APRN  [address] [place] [address]    Chief Complaint   Patient presents with   • Atrial Fibrillation     Cardiac PMH: (Old records have been reviewed and summarized below)  1.  Atrial fibrillation-diagnosed August 2019-unable to tolerate Propafenone or Multaq therapy  2.  Aortic valve regurgitation-mild to moderate    Allergies  Allergies   Allergen Reactions   • Levaquin [Levofloxacin] Shortness Of Breath and Swelling   • Multaq [Dronedarone] Shortness Of Breath, Rash and Dizziness   • Rythmol [Propafenone Hcl] Nausea And Vomiting, Other (See Comments) and Confusion     Syncope   • Formaldehyde Rash   • Iodine Other (See Comments)     \"open sores\"   • Nickel Rash   • Propafenone Nausea And Vomiting   • Quinolones Nausea And Vomiting   • Sulfa Antibiotics Nausea And Vomiting   • Zyrtec [Cetirizine] Nausea And Vomiting       Current Medications    Current Outpatient Medications:   •  ketorolac (ACULAR) 0.4 % solution, Administer 1 drop into the left eye As Needed., Disp: , Rfl:   •  Loratadine (Claritin) 10 MG capsule, Take  by mouth As Needed., Disp: , Rfl:   •  multivitamin (MULTI-VITAMIN PO), Take 1 tablet by mouth Daily., Disp: , Rfl:   •  Omega-3 Fatty Acids (OMEGA 3 PO), Take 3 tablets by mouth Daily., Disp: , Rfl:     History of Present Illness     Pt presents for follow up of AF. Since we last saw the pt, pt denies any AF episodes, SOB, CP, LH, and dizziness. Denies any hospitalizations, ER visits, bleeding, or TIA/CVA symptoms. Has not been taking ASA or metoprolol. BP well controlled. Overall feels well.    ROS:  General:  Denies fatigue, weight gain or loss  Cardiovascular:  Denies CP, PND, syncope, near syncope, edema or palpitations.  Pulmonary:  Denies GONZALEZ, cough, or wheezing      Vitals:    12/17/21 1044   BP: 110/72 " "  BP Location: Right arm   Patient Position: Sitting   Pulse: 70   SpO2: 99%   Weight: 65.3 kg (144 lb)   Height: 174 cm (68.5\")     Body mass index is 21.58 kg/m².  PE:  General: NAD  Neck: no JVD, no carotid bruits, no TM  Heart RRR, NL S1, S2, S4 present, no rubs, murmurs  Lungs: CTA, no wheezes, rhonchi, or rales  Abd: soft, non-tender, NL BS  Ext: No musculoskeletal deformities, no edema, cyanosis, or clubbing  Psych: normal mood and affect    Diagnostic Data:      Procedures    1. PAF (paroxysmal atrial fibrillation) ([facility])      Plan:    1.  Paroxysmal Atrial fibrillation  -has failed Propafenone and Multaq therapy. Refused PVA in the past, overall feels her past episode was self imposed and declines medication at this time. She is off her metoprolol now.     -CHADsVasc=2, has refused NOAC in the past. Was using ASA daily but no longer taking. Does not want to be on blood thinners.     F/up PRN     Electronically signed by ADARSH España, 12/17/21, 10:58 AM EST.      "

## 2023-01-24 ENCOUNTER — TELEPHONE (OUTPATIENT)
Dept: CARDIOLOGY | Facility: CLINIC | Age: 73
End: 2023-01-24
Payer: MEDICARE

## 2023-01-24 DIAGNOSIS — I48.0 PAF (PAROXYSMAL ATRIAL FIBRILLATION): Primary | ICD-10-CM

## 2023-01-24 NOTE — TELEPHONE ENCOUNTER
Patient called asking to come into office for a EKG. She does not have an order for EKG from an provider.  She states her HR is up to 185 BPM.  I advised patient she needs to go to nearest ER for evaluation.  I explained to patient she was last seen by Dr. Camargo on 9/23/2019 and since it has been over 3 years that she is not an established patient.  I advised patient after an ER evaluation she may need to contact her EP doctor (last seen Dr. West on 12/17/2021) or PCP to schedule a follow up visit.  She verbalized understanding.

## 2023-01-24 NOTE — TELEPHONE ENCOUNTER
I received message that patient would like an EKG order.  I contacted patient who states that she has been in irregular HR since Wednesday. She states her HR is ranging from 65 to 180. She states this causes her to be lightheaded, dizzy, slight chest tightness and SOB. Patient is not taking any medications at this time.   I reminded her of importance for follow up appointments for continuation of care. She voiced understanding. I also advised her if her condition declines that she needs to go to the ER.   I advised her to have EKG done, she would like to have this done at [facility]. EKG order faxed.

## 2023-02-15 PROBLEM — E83.42 HYPOMAGNESEMIA: Status: RESOLVED | Noted: 2019-09-23 | Resolved: 2023-02-15

## 2023-02-17 ENCOUNTER — OFFICE VISIT (OUTPATIENT)
Dept: CARDIOLOGY | Facility: CLINIC | Age: 73
End: 2023-02-17
Payer: MEDICARE

## 2023-02-17 VITALS
DIASTOLIC BLOOD PRESSURE: 80 MMHG | OXYGEN SATURATION: 98 % | BODY MASS INDEX: 21.33 KG/M2 | HEIGHT: 69 IN | SYSTOLIC BLOOD PRESSURE: 120 MMHG | HEART RATE: 73 BPM | WEIGHT: 144 LBS

## 2023-02-17 DIAGNOSIS — I48.0 PAF (PAROXYSMAL ATRIAL FIBRILLATION): Primary | ICD-10-CM

## 2023-02-17 PROCEDURE — 99214 OFFICE O/P EST MOD 30 MIN: CPT | Performed by: STUDENT IN AN ORGANIZED HEALTH CARE EDUCATION/TRAINING PROGRAM

## 2023-02-17 NOTE — PROGRESS NOTES
Cardiac Electrophysiology Outpatient Note  [facility] at [facility]    Office Visit     Gemma Cabello  [id number]  02/17/2023    Primary Care Physician: Mariajose Zamora MD    Referred By: No ref. provider found    Subjective     Chief Complaint   Patient presents with   • PAF (paroxysmal atrial fibrillation       History of Present Illness:   Gemma Cabello is a 72 y.o. female who presents to my electrophysiology clinic for follow up of paroxysmal atrial fibrillation.  She previously been seen by Dr. Duran.  She has had infrequent episodes of atrial fibrillation.  She had been tried on Rythmol in the past but had a significant reaction to this.  Therefore she is been off all antiarrhythmic agents.  She previously been on aspirin prior but has not taken this currently.    She did have an episode of heart racing in January.  She just had COVID at the time.  She used her 's Apple Watch and saw that her heart rate was up to 186 beats per minutes.  At times it did appear to be more regular, but tachycardic.  This lasted for around 6 days but since then she has been back in normal rhythm.  She overall is feeling quite well.  Last episode of atrial fibrillation prior to this was in 2019.        Past Medical History:   Diagnosis Date   • Abnormal ECG    • Abnormal heart rhythm    • Asthma    • Atrial fibrillation (HCC)    • History of breast augmentation    • History of heart attack    • History of varicose vein stripping    • Seasonal allergies        Past Surgical History:   Procedure Laterality Date   • BREAST AUGMENTATION     • BREAST RECONSTRUCTION     • BREAST SURGERY     • CARDIOVASCULAR STRESS TEST  10/01/2019    R.Stress- 7 Min, 10.1 METS. 89% THR. BP- 129/78. Rare PVC. Negative.   • CATARACT EXTRACTION      left   • ECHO - CONVERTED  10/01/2019    EF 60%. Mild- Mod AI. Trace MR. RVSP- 28 mmHg.   • TUBAL ABDOMINAL LIGATION         Family History  "  Problem Relation Age of Onset   • Cancer Mother    • Atrial fibrillation Father         daughter thinks he had afib, was on Coumadin   • Alzheimer's disease Father    • No Known Problems Sister    • Heart murmur Brother    • Heart murmur Brother    • Heart attack Maternal Grandfather    • Heart attack Paternal Grandfather    • No Known Problems Daughter    • No Known Problems Daughter    • No Known Problems Son        Social History     Socioeconomic History   • Marital status:    Tobacco Use   • Smoking status: Never   • Smokeless tobacco: Never   Substance and Sexual Activity   • Alcohol use: Yes     Alcohol/week: 3.0 standard drinks     Types: 3 Shots of liquor per week   • Drug use: No   • Sexual activity: Defer       No current outpatient medications on file.    Allergies:   Allergies   Allergen Reactions   • Levaquin [Levofloxacin] Shortness Of Breath and Swelling   • Multaq [Dronedarone] Shortness Of Breath, Rash and Dizziness   • Rythmol [Propafenone Hcl] Nausea And Vomiting, Other (See Comments) and Confusion     Syncope   • Formaldehyde Rash   • Iodine Other (See Comments)     \"open sores\"   • Nickel Rash   • Propafenone Nausea And Vomiting   • Quinolones Nausea And Vomiting   • Sulfa Antibiotics Nausea And Vomiting   • Zyrtec [Cetirizine] Nausea And Vomiting       Objective   Vital Signs: Blood pressure 120/80, pulse 73, height 174 cm (68.5\"), weight 65.3 kg (144 lb), SpO2 98 %.    PHYSICAL EXAM  General appearance: Awake, alert, cooperative  Head: Normocephalic, without obvious abnormality, atraumatic  Neck: No JVD  Lungs: Clear to ascultation bilaterally  Heart: Regular rate and rhythm, no murmurs, 2+ LE pulses, no lower extremity swelling  Skin: Skin color, turgor normal, no rashes or lesions  Neurologic: Grossly normal     No results found for: GLUCOSE, CALCIUM, NA, K, CO2, CL, BUN, CREATININE, EGFRIFAFRI, EGFRIFNONA, BCR, ANIONGAP  No results found for: WBC, HGB, HCT, MCV, PLT  No results " found for: INR, PROTIME  Lab Results   Component Value Date    TSH 3.850 10/01/2019          Results for orders placed during the hospital encounter of 10/01/19    Adult Transthoracic Echo Complete W/ Cont if Necessary Per Protocol    Interpretation Summary  · Normal left ventricular cavity size and wall thickness  · Estimated EF appears to be in the range of 56 - 60%.  · Left ventricular diastolic function is normal  · Normal left atrial size noted.  · No aortic valve stenosis is present.  · Mild to moderate aortic valve regurgitation is present.  · Trace mitral valve regurgitation is present  · Mild tricuspid valve regurgitation is present. RVSP- 28 mmHg.         Gemma Cabello  reports that she has never smoked. She has never used smokeless tobacco..     Advance Care Planning   Advance Care Planning: ACP discussion was held with the patient during this visit. Patient does not have an advance directive, information provided.     Assessment & Plan    1. PAF (paroxysmal atrial fibrillation) (HCC)  Patient has history of paroxysmal atrial fibrillation.  She had an episode this January that possibly involve atrial flutter as well however we do not have any tracings of this.  Last episode prior to this was in 2019.  This most recent episode was in the setting of COVID-19 infection.  We discussed that this could just be an episode related to COVID-19 was possible that her atrial fibrillation is also beginning to become more frequent.  Only way to know will be continued monitoring.  We discussed other options for treatment going forward.  We discussed the option of pill in the pocket antiarrhythmics.  However, she had a bad reaction to propafenone in the past I do not think is a good option.  We also huma the option of daily antiarrhythmics versus catheter ablation at some point.  Given that her episodes are so rare at the current time much of this is necessary.    Her CHADS2 Vascor is 2 (female and age).  Would  recommend being on a daily aspirin.       Follow Up:  Return in about 1 year (around 2/17/2024).      Thank you for allowing me to participate in the care of your patient. Please do not hesitate to contact me with additional questions or concerns.      Jaison West M.D.  Cardiac Electrophysiologist  [facility] / [facility]

## 2023-02-27 ENCOUNTER — TELEPHONE (OUTPATIENT)
Dept: CARDIOLOGY | Facility: CLINIC | Age: 73
End: 2023-02-27
Payer: MEDICARE

## 2023-02-27 NOTE — TELEPHONE ENCOUNTER
Patient called to make you aware that her HR has stayed between 110-187 since yesterday afternoon at 2:30. She said that her average HR has been 140. She states that when she is just sitting her HR will stay around 100 and she feels fine. When she gets up moving her HR goes up, she has mild chest pain, shortness of breath and her arms and legs feel very weak. She has not been checking her BP. She said that she is going to the ER at [facility] to be evaluated. She will have them fax you the records.

## 2023-02-28 NOTE — TELEPHONE ENCOUNTER
Patient called back and states that she did go to [facility] last night. They prescribed Metoprolol and Eliquis. This morning she states her HR is still around 135 she is not sure what her BP is. She states she has not gotten her scripts filled yet. I updated her that she should follow her discharge instructions and that hospital records would be requested and if Dr. West advised further instructions she would receive a call back.

## 2023-03-17 ENCOUNTER — OFFICE VISIT (OUTPATIENT)
Dept: CARDIOLOGY | Facility: CLINIC | Age: 73
End: 2023-03-17
Payer: MEDICARE

## 2023-03-17 VITALS
HEIGHT: 69 IN | OXYGEN SATURATION: 95 % | DIASTOLIC BLOOD PRESSURE: 60 MMHG | SYSTOLIC BLOOD PRESSURE: 98 MMHG | HEART RATE: 70 BPM | WEIGHT: 140 LBS | BODY MASS INDEX: 20.73 KG/M2

## 2023-03-17 DIAGNOSIS — I48.0 PAF (PAROXYSMAL ATRIAL FIBRILLATION): Primary | ICD-10-CM

## 2023-03-17 PROCEDURE — 99214 OFFICE O/P EST MOD 30 MIN: CPT | Performed by: STUDENT IN AN ORGANIZED HEALTH CARE EDUCATION/TRAINING PROGRAM

## 2023-03-17 RX ORDER — APIXABAN 5 MG/1
1 TABLET, FILM COATED ORAL EVERY 12 HOURS SCHEDULED
COMMUNITY
Start: 2023-02-27

## 2023-03-17 NOTE — PROGRESS NOTES
Cardiac Electrophysiology Outpatient Note  [facility] at [facility]    Office Visit     Gemma Cabello  [id number]  02/17/2023    Primary Care Physician: Mariajose Zamora MD    Referred By: No ref. provider found    Subjective     Chief Complaint   Patient presents with   • PAF (paroxysmal atrial fibrillation) (HCC)       History of Present Illness:   Gemma Cabello is a 72 y.o. female who presents to my electrophysiology clinic for follow up of paroxysmal atrial fibrillation.  She previously been seen by Dr. Duran.  She has had infrequent episodes of atrial fibrillation.  She had been tried on Rythmol in the past but had a significant reaction to this.  Therefore she is been off all antiarrhythmic agents.      We recently saw her a month or so ago.  Unfortunate she has had multiple episodes of atrial fibrillation since then.  These are becoming increasingly frequent and increasing in duration.  She is quite bothered by these.  She was started on Eliquis and metoprolol.  The metoprolol she thinks is been causing her a lot of symptoms, most notably fatigue.        Past Medical History:   Diagnosis Date   • Abnormal ECG    • Abnormal heart rhythm    • Asthma    • Atrial fibrillation (HCC)    • History of breast augmentation    • History of heart attack    • History of varicose vein stripping    • Seasonal allergies        Past Surgical History:   Procedure Laterality Date   • BREAST AUGMENTATION     • BREAST RECONSTRUCTION     • BREAST SURGERY     • CARDIOVASCULAR STRESS TEST  10/01/2019    R.Stress- 7 Min, 10.1 METS. 89% THR. BP- 129/78. Rare PVC. Negative.   • CATARACT EXTRACTION      left   • ECHO - CONVERTED  10/01/2019    EF 60%. Mild- Mod AI. Trace MR. RVSP- 28 mmHg.   • TUBAL ABDOMINAL LIGATION         Family History   Problem Relation Age of Onset   • Cancer Mother    • Atrial fibrillation Father         daughter thinks he had afib, was on Coumadin   •  "Alzheimer's disease Father    • No Known Problems Sister    • Heart murmur Brother    • Heart murmur Brother    • Heart attack Maternal Grandfather    • Heart attack Paternal Grandfather    • No Known Problems Daughter    • No Known Problems Daughter    • No Known Problems Son        Social History     Socioeconomic History   • Marital status:    Tobacco Use   • Smoking status: Never   • Smokeless tobacco: Never   Vaping Use   • Vaping Use: Never used   Substance and Sexual Activity   • Alcohol use: Yes     Alcohol/week: 3.0 standard drinks     Types: 3 Shots of liquor per week   • Drug use: No   • Sexual activity: Defer         Current Outpatient Medications:   •  Eliquis 5 MG tablet tablet, Take 1 tablet by mouth Every 12 (Twelve) Hours., Disp: , Rfl:     Allergies:   Allergies   Allergen Reactions   • Levaquin [Levofloxacin] Shortness Of Breath and Swelling   • Multaq [Dronedarone] Shortness Of Breath, Rash and Dizziness   • Rythmol [Propafenone Hcl] Nausea And Vomiting, Other (See Comments) and Confusion     Syncope   • Formaldehyde Rash   • Iodine Other (See Comments)     \"open sores\"   • Nickel Rash   • Propafenone Nausea And Vomiting   • Quinolones Nausea And Vomiting   • Sulfa Antibiotics Nausea And Vomiting   • Zyrtec [Cetirizine] Nausea And Vomiting       Objective   Vital Signs: Blood pressure 98/60, pulse 70, height 175.3 cm (69\"), weight 63.5 kg (140 lb), SpO2 95 %.    PHYSICAL EXAM  General appearance: Awake, alert, cooperative  Head: Normocephalic, without obvious abnormality, atraumatic  Neck: No JVD  Lungs: Clear to ascultation bilaterally  Heart: Regular rate and rhythm, no murmurs, 2+ LE pulses, no lower extremity swelling  Skin: Skin color, turgor normal, no rashes or lesions  Neurologic: Grossly normal     No results found for: GLUCOSE, CALCIUM, NA, K, CO2, CL, BUN, CREATININE, EGFRIFAFRI, EGFRIFNONA, BCR, ANIONGAP  No results found for: WBC, HGB, HCT, MCV, PLT  No results found for: INR, " PROTIME  Lab Results   Component Value Date    TSH 3.850 10/01/2019          Results for orders placed during the hospital encounter of 10/01/19    Adult Transthoracic Echo Complete W/ Cont if Necessary Per Protocol    Interpretation Summary  · Normal left ventricular cavity size and wall thickness  · Estimated EF appears to be in the range of 56 - 60%.  · Left ventricular diastolic function is normal  · Normal left atrial size noted.  · No aortic valve stenosis is present.  · Mild to moderate aortic valve regurgitation is present.  · Trace mitral valve regurgitation is present  · Mild tricuspid valve regurgitation is present. RVSP- 28 mmHg.         Gemma Cabello  reports that she has never smoked. She has never used smokeless tobacco..     Advance Care Planning   Advance Care Planning: ACP discussion was held with the patient during this visit. Patient does not have an advance directive, information provided.     Assessment & Plan    1. PAF (paroxysmal atrial fibrillation) (HCC)  He has paroxysmal atrial fibrillation seems to have intensified after COVID infection.  She is having frequent episodes now that are quite bothersome to her.  She is on Eliquis as well as metoprolol.  The metoprolol is been causing her a lot of symptoms we will go ahead and stop this.  We discussed options going forward.  We discussed the option of starting antiarrhythmic agent such as flecainide versus catheter ablation.  She is interested in the latter.  We discussed how this is performed and she would like to work on scheduling this.       Follow Up:  Return for f/u after procedure.      Thank you for allowing me to participate in the care of your patient. Please do not hesitate to contact me with additional questions or concerns.      Jaison West M.D.  Cardiac Electrophysiologist  [facility] / [facility]

## 2023-03-20 DIAGNOSIS — I48.0 PAF (PAROXYSMAL ATRIAL FIBRILLATION): Primary | ICD-10-CM

## 2023-03-31 ENCOUNTER — PREP FOR SURGERY (OUTPATIENT)
Dept: OTHER | Facility: HOSPITAL | Age: 73
End: 2023-03-31
Payer: MEDICARE

## 2023-03-31 DIAGNOSIS — I48.0 PAF (PAROXYSMAL ATRIAL FIBRILLATION): Primary | ICD-10-CM

## 2023-03-31 RX ORDER — NITROGLYCERIN 0.4 MG/1
0.4 TABLET SUBLINGUAL
OUTPATIENT
Start: 2023-03-31

## 2023-03-31 RX ORDER — SODIUM CHLORIDE 9 MG/ML
40 INJECTION, SOLUTION INTRAVENOUS AS NEEDED
OUTPATIENT
Start: 2023-03-31

## 2023-03-31 RX ORDER — ACETAMINOPHEN 325 MG/1
650 TABLET ORAL EVERY 4 HOURS PRN
OUTPATIENT
Start: 2023-03-31

## 2023-03-31 RX ORDER — ONDANSETRON 2 MG/ML
4 INJECTION INTRAMUSCULAR; INTRAVENOUS EVERY 6 HOURS PRN
OUTPATIENT
Start: 2023-03-31

## 2023-04-20 ENCOUNTER — TELEPHONE (OUTPATIENT)
Dept: ADMINISTRATIVE | Facility: HOSPITAL | Age: 73
End: 2023-04-20
Payer: MEDICARE

## 2023-04-20 ENCOUNTER — HOSPITAL ENCOUNTER (OUTPATIENT)
Dept: CT IMAGING | Facility: HOSPITAL | Age: 73
Discharge: HOME | End: 2023-04-20
Payer: MEDICARE

## 2023-04-20 ENCOUNTER — PRE-ADMISSION TESTING (OUTPATIENT)
Dept: PREADMISSION TESTING | Facility: HOSPITAL | Age: 73
End: 2023-04-20
Payer: MEDICARE

## 2023-04-20 DIAGNOSIS — I48.0 PAF (PAROXYSMAL ATRIAL FIBRILLATION): Primary | ICD-10-CM

## 2023-04-20 DIAGNOSIS — I48.0 PAF (PAROXYSMAL ATRIAL FIBRILLATION): ICD-10-CM

## 2023-04-20 LAB
ANION GAP SERPL CALCULATED.3IONS-SCNC: 9 MMOL/L (ref 5–15)
BUN SERPL-MCNC: 17 MG/DL (ref 8–23)
BUN/CREAT SERPL: 23 (ref 7–25)
CALCIUM SPEC-SCNC: 9.7 MG/DL (ref 8.6–10.5)
CHLORIDE SERPL-SCNC: 105 MMOL/L (ref 98–107)
CO2 SERPL-SCNC: 28 MMOL/L (ref 22–29)
CREAT SERPL-MCNC: 0.74 MG/DL (ref 0.57–1)
DEPRECATED RDW RBC AUTO: 48.9 FL (ref 37–54)
EGFRCR SERPLBLD CKD-EPI 2021: 86.1 ML/MIN/1.73
ERYTHROCYTE [DISTWIDTH] IN BLOOD BY AUTOMATED COUNT: 14.5 % (ref 12.3–15.4)
GLUCOSE SERPL-MCNC: 94 MG/DL (ref 65–99)
HCT VFR BLD AUTO: 41 % (ref 34–46.6)
HGB BLD-MCNC: 13.1 G/DL (ref 12–15.9)
MAGNESIUM SERPL-MCNC: 2 MG/DL (ref 1.6–2.4)
MCH RBC QN AUTO: 29.4 PG (ref 26.6–33)
MCHC RBC AUTO-ENTMCNC: 32 G/DL (ref 31.5–35.7)
MCV RBC AUTO: 92.1 FL (ref 79–97)
PLATELET # BLD AUTO: 241 10*3/MM3 (ref 140–450)
PMV BLD AUTO: 10.2 FL (ref 6–12)
POTASSIUM SERPL-SCNC: 4.4 MMOL/L (ref 3.5–5.2)
RBC # BLD AUTO: 4.45 10*6/MM3 (ref 3.77–5.28)
SODIUM SERPL-SCNC: 142 MMOL/L (ref 136–145)
TSH SERPL DL<=0.05 MIU/L-ACNC: 3.62 UIU/ML (ref 0.27–4.2)
WBC NRBC COR # BLD: 5.36 10*3/MM3 (ref 3.4–10.8)

## 2023-04-20 PROCEDURE — 80048 BASIC METABOLIC PNL TOTAL CA: CPT

## 2023-04-20 PROCEDURE — 83735 ASSAY OF MAGNESIUM: CPT

## 2023-04-20 PROCEDURE — 84443 ASSAY THYROID STIM HORMONE: CPT

## 2023-04-20 PROCEDURE — 71275 CT ANGIOGRAPHY CHEST: CPT

## 2023-04-20 PROCEDURE — 25510000001 IOPAMIDOL PER 1 ML: Performed by: STUDENT IN AN ORGANIZED HEALTH CARE EDUCATION/TRAINING PROGRAM

## 2023-04-20 PROCEDURE — 36415 COLL VENOUS BLD VENIPUNCTURE: CPT

## 2023-04-20 PROCEDURE — 85027 COMPLETE CBC AUTOMATED: CPT

## 2023-04-20 RX ADMIN — IOPAMIDOL 80 ML: 755 INJECTION, SOLUTION INTRAVENOUS at 14:30

## 2023-04-20 NOTE — TELEPHONE ENCOUNTER
Pt presented to [facility] today for PVA on 4/26 with Dr. West. Tiffanie SCOTT at [facility] called to inform pt stopped Eliquis 2 weeks ago due to black/bloody stools. Pt has not informed any healthcare providers of this until [facility] today. Will send message to Dr. West and VEL for advisement.

## 2023-04-20 NOTE — DISCHARGE INSTRUCTIONS
"Dear Patient,    Do NOT eat, drink, or smoke after midnight the night before your procedure.   Take your medications as instructed by your doctor.    Glasses and jewelry may be worn, but dentures must be removed prior to your procedure.    Leave any items you consider valuable at home.      MORNING of your Procedure, please bring the following:     -Photo ID and insurance card(s)    -ALL medications in their ORIGINAL CONTAINERS    -Co-pay and/or deductible required by your insurance   -Copy of living will or power of  document (if not brought to Pre-Admission Testing department)   -CPAP mask and tubing, not your machine (if applicable)   -Relaxation aids (music, books, magazines)   -Skin Prep Instruction Sheet (if applicable)       Check in is on the 2nd floor of the [address] [facility].  Your procedure will be performed in the cath lab or EP lab.  During your procedure, your family will wait in the cath lab waiting area where you checked in.      Need to make arrangements for transportation prior to discharge.    A handout regarding \"Heart Healthy Eating\" was provided today to encourage healthy eating habits.    Educational handout related to procedure was given in Pre-Admission testing.  The educational handout is for informational purposes only.  If you have any questions about your procedure, please speak with your physician.      Please note:  If you are scheduled to have one of the following procedures: Pulmonary Vein Ablation, Lead Extraction, MitraClip, Cerebral Coilings or Embolization, please let your family know that after your procedure you will be going to recovery unit on the 2nd floor of the [facility].  When the physician is finished speaking with your family after your procedure is completed, your family will be directed or escorted to the surgery waiting area in the [address] [place] [address].  This is where your family will wait until you are given a room assignment and then your family will be directed " to the appropriate unit.

## 2023-04-20 NOTE — PAT
Patient viewed general PAT education video as instructed in their preoperative information received from their surgeon.  Patient stated the general PAT education video was viewed in its entirety and survey completed.  Copies of PAT general education handouts (Incentive Spirometry, Meds to Beds Program, Patient Belongings, Pre-op skin preparation instructions, Blood Glucose testing, Visitor policy, Surgery FAQ, Code H) distributed to patient if not printed. Education related to the PAT pass and skin preparation for surgery (if applicable) completed in PAT as a reinforcement to PAT education video. Patient instructed to return PAT pass provided today as well as completed skin preparation sheet (if applicable) on the day of procedure.     Additionally if patient had not viewed video yet but intended to view it at home or in our waiting area, then referred them to the handout with QR code/link provided during PAT visit.  Instructed patient to complete survey after viewing the video in its entirety.  Encouraged patient/family to read PAT general education handouts thoroughly and notify PAT staff with any questions or concerns. Patient verbalized understanding of all information and priority content.    An arrival time for procedure was not provided during PAT visit. If patient had any questions or concerns about their arrival time, they were instructed to contact their surgeon/physician.  Additionally, if the patient referred to an arrival time that was acquired from their my chart account, patient was encouraged to verify that time with their surgeon/physician. Arrival times are NOT provided in Pre Admission Testing Department.    Patient denies any current skin issues.     Patient directed to Radiology Department for CT SCAN after Pre Admission Testing Appointment.     PATIENT STATES THAT SHE TOOK HERSELF OFF ELIQUIS ON 4/10/23 DUE TO NOTICING BLOOD IN STOOLS. JORJE AT DR SWANSON'S OFFICE NOTIFIED AND WILL ALERT   KIKI

## 2023-04-24 DIAGNOSIS — R91.1 PULMONARY NODULE: Primary | ICD-10-CM

## 2023-04-24 DIAGNOSIS — K92.2 GASTROINTESTINAL HEMORRHAGE, UNSPECIFIED GASTROINTESTINAL HEMORRHAGE TYPE: ICD-10-CM

## 2023-05-02 ENCOUNTER — OFFICE VISIT (OUTPATIENT)
Dept: PULMONOLOGY | Facility: CLINIC | Age: 73
End: 2023-05-02
Payer: MEDICARE

## 2023-05-02 VITALS
HEIGHT: 69 IN | BODY MASS INDEX: 20.71 KG/M2 | OXYGEN SATURATION: 98 % | SYSTOLIC BLOOD PRESSURE: 118 MMHG | DIASTOLIC BLOOD PRESSURE: 80 MMHG | HEART RATE: 84 BPM | WEIGHT: 139.8 LBS | TEMPERATURE: 97.8 F

## 2023-05-02 DIAGNOSIS — R91.1 PULMONARY NODULE: Primary | ICD-10-CM

## 2023-05-02 RX ORDER — MULTIPLE VITAMINS W/ MINERALS TAB 9MG-400MCG
1 TAB ORAL DAILY
COMMUNITY

## 2023-05-02 NOTE — PROGRESS NOTES
[facility]od[facility]     Patient Name: Gemma Cabello    Primary Care Physician: Mariajose Zamora MD    Referring Physician: Jaison West MD    Chief Complaint:    Chief Complaint   Patient presents with   • Lung Nodule     Subjective      History of Present Illness: Gemma Cabello is a 73 y.o. female who is here today for evaluation of abnormal thoracic imaging, incidental pulmonary nodule.  Patient has a past medical history of atrial fibrillation diagnosed in August 2019 after presenting to OSH with palpitations.  She follows with cardiology at [facility] for ongoing medical management but remains intermittently symptomatic.  Patient considering initiation of antiarrhythmic agent (i.e. flecainide) versus catheter ablation.  Most interested in catheter ablation and CT angiogram was performed for further evaluation on 4/20/2023.  An incidental 15 mm soft tissue density was seen in patient's posterior, left lower lobe.  She was referred to pulmonary for further management and evaluation.  Patient otherwise very healthy.  She notes sensitivity to fragrances, smoke and pollen.  She was previously diagnosed with asthma (2016) and was on albuterol and steroid inhalers; however, respiratory symptoms improved after lifestyle modifications and she DC the medications.  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  She denies ongoing dyspnea, cough, hemoptysis, unintentional weight loss, fever, chills, night sweats, syncope, presyncope.    Review of Systems: Fourteen point review of system performed and negative except for that mentioned in HPI.     Past Medical History:   Past Medical History:   Diagnosis Date   • Abnormal ECG    • Abnormal heart rhythm    • Arthritis    • Asthma    • Atrial fibrillation    • COVID     2022   • History of breast augmentation    • History of heart attack    • History of varicose vein stripping    • PONV (postoperative  "nausea and vomiting)    • Seasonal allergies      Past Surgical History:   Past Surgical History:   Procedure Laterality Date   • BREAST AUGMENTATION     • BREAST RECONSTRUCTION     • BREAST SURGERY     • CARDIOVASCULAR STRESS TEST  10/01/2019    R.Stress- 7 Min, 10.1 METS. 89% THR. BP- 129/78. Rare PVC. Negative.   • CATARACT EXTRACTION      left   • ECHO - CONVERTED  10/01/2019    EF 60%. Mild- Mod AI. Trace MR. RVSP- 28 mmHg.   • TUBAL ABDOMINAL LIGATION       Family History:   Family History   Problem Relation Age of Onset   • Cancer Mother    • Atrial fibrillation Father         daughter thinks he had afib, was on Coumadin   • Alzheimer's disease Father    • No Known Problems Sister    • Heart murmur Brother    • Heart murmur Brother    • Heart attack Maternal Grandfather    • Heart attack Paternal Grandfather    • No Known Problems Daughter    • No Known Problems Daughter    • No Known Problems Son      Social History:   Social History     Socioeconomic History   • Marital status:    Tobacco Use   • Smoking status: Never   • Smokeless tobacco: Never   Vaping Use   • Vaping Use: Never used   Substance and Sexual Activity   • Alcohol use: Not Currently   • Drug use: No   • Sexual activity: Defer     Medications:     Current Outpatient Medications:   •  multivitamin with minerals tablet tablet, Take 1 tablet by mouth Daily., Disp: , Rfl:   •  Eliquis 5 MG tablet tablet, Take 1 tablet by mouth Every 12 (Twelve) Hours. STOPPED TAKING ON 4/10/23 DUE TO BLOODY STOOLS (Patient not taking: Reported on 5/2/2023), Disp: , Rfl:     Allergies:   Allergies   Allergen Reactions   • Iodine Other (See Comments)     \"open sores\"   • Levaquin [Levofloxacin] Shortness Of Breath and Swelling   • Multaq [Dronedarone] Shortness Of Breath, Rash and Dizziness   • Rythmol [Propafenone Hcl] Nausea And Vomiting, Other (See Comments) and Confusion     Syncope   • Formaldehyde Rash   • Nickel Rash   • Propafenone Nausea And Vomiting " "  • Quinolones Nausea And Vomiting   • Sulfa Antibiotics Nausea And Vomiting   • Zyrtec [Cetirizine] Nausea And Vomiting     Immunizations:   Immunization History   Administered Date(s) Administered   • COVID-19 (MODERNA) BIVALENT 12+YRS 10/31/2022   • COVID-19 (PFIZER) Purple Cap Monovalent 02/28/2021, 03/27/2021, 11/30/2021   • Fluad Quad 65+ 10/15/2021, 10/31/2022   • Fluzone High Dose =>65 Years (Vaxcare ONLY) 10/25/2019   • Fluzone High-Dose 65+yrs 09/25/2020   • Zostavax 10/04/2017     Objective     Physical Exam:  Vital Signs: Blood pressure 118/80, pulse 84, temperature 97.8 °F (36.6 °C), height 175.3 cm (69\"), weight 63.4 kg (139 lb 12.8 oz), SpO2 98 %.    General: The patient appears in no acute distress. Alert, cooperative and interactive.  HEENT:NC/AT, PERRL, Normal nasal mucosa, MMM.  Neck: Trachea midline, No masses, No JVD.  Lymphadenopathy: No palpable anterior cervical, submandibular, submental, or posterior cervical lymphadenopathy. No palpable supra- or infraclavicular lymphadenopathy.   Chest: Clear to auscultation bilaterally, No wheezing, rhonchi, or rales. Normal work of breathing. Equal chest rise.  Cardiac: Regular rhythm, normal rate, S1S2 auscultated. No murmurs, rubs or gallops.   Extremities: No lower extremity edema. No clubbing or cyanosis.  Neuro: Motor power grossly intact bilaterally. Speech fluid and fluent. Thought process coherent.  Psych: Alert and oriented x 3, Mood stable.    Imaging:   Radiology Impression:   1. 4.3 cm ascending thoracic aortic aneurysm.  2. No anomalous pulmonary venous return.  3. Left atrial appendage well-opacified without significant thrombus burden.  4. Esophagus traverses the chest posterior to the left atrium just left of midline.  5. Nonspecific 15 mm area of nodular soft tissue density seen posteriorly in the left lower lobe. Consider short-term follow-up diagnostic contrast-enhanced CT of the chest to further evaluate. This could represent some " transient consolidation or volume loss, however raises concern for suspicious nodule.    Assessment / Plan      # Pulmonary nodule; LLL    - Questionable image at time of previous radiographic evaluation.  Schedule short-term follow-up in 6-8x weeks. Will see in clinic after  - On return given aforementioned history consider obtaining labs (CBC with differential, IgE and QuantiFERON gold)    -- Luis Lott MD  Pulmonary/Critical Care    Level of service justified based on 35 minutes spent in patient care on this date of service including, but not limited to: preparing to see the patient, obtaining and/or reviewing history, performing medically appropriate examination, ordering tests/medicine/procedures, independently interpreting results, documenting clinical information in EHR, and counseling/education of patient/family/caregiver (excluding time spent on other separate services such as performing procedures or test interpretation, if applicable). (Level 4 45-59 minutes; Level 5 60-74 minutes)

## 2023-05-03 ENCOUNTER — TELEPHONE (OUTPATIENT)
Dept: CARDIOLOGY | Facility: CLINIC | Age: 73
End: 2023-05-03
Payer: MEDICARE

## 2023-05-03 DIAGNOSIS — R91.1 LUNG NODULE: Primary | ICD-10-CM

## 2023-05-03 NOTE — TELEPHONE ENCOUNTER
Patient has seen Dr. Lott. He wants her to have a CT scan of her chest. She is still waiting on the GI referral. She would like to know if you will be monitoring her aneurysm? She is just concerned.

## 2023-05-04 NOTE — TELEPHONE ENCOUNTER
I spoke with the patient. I gave her the # to  Gastroenterology. She is going to call and schedule an appointment.

## 2023-05-12 ENCOUNTER — OUTSIDE FACILITY SERVICE (OUTPATIENT)
Dept: GASTROENTEROLOGY | Facility: CLINIC | Age: 73
End: 2023-05-12
Payer: MEDICARE

## 2023-05-12 ENCOUNTER — LAB REQUISITION (OUTPATIENT)
Dept: LAB | Facility: HOSPITAL | Age: 73
End: 2023-05-12
Payer: MEDICARE

## 2023-05-12 DIAGNOSIS — K92.2 GASTROINTESTINAL HEMORRHAGE, UNSPECIFIED: ICD-10-CM

## 2023-05-12 DIAGNOSIS — K44.9 DIAPHRAGMATIC HERNIA WITHOUT OBSTRUCTION OR GANGRENE: ICD-10-CM

## 2023-05-12 DIAGNOSIS — K92.1 MELENA: ICD-10-CM

## 2023-05-12 DIAGNOSIS — K22.89 OTHER SPECIFIED DISEASE OF ESOPHAGUS: ICD-10-CM

## 2023-05-12 PROCEDURE — 88305 TISSUE EXAM BY PATHOLOGIST: CPT

## 2023-05-12 PROCEDURE — 43239 EGD BIOPSY SINGLE/MULTIPLE: CPT | Performed by: INTERNAL MEDICINE

## 2023-05-12 PROCEDURE — 99204 OFFICE O/P NEW MOD 45 MIN: CPT | Performed by: INTERNAL MEDICINE

## 2023-05-15 ENCOUNTER — HOSPITAL ENCOUNTER (OUTPATIENT)
Dept: CT IMAGING | Facility: HOSPITAL | Age: 73
Discharge: HOME | End: 2023-05-15
Admitting: INTERNAL MEDICINE
Payer: MEDICARE

## 2023-05-15 DIAGNOSIS — R91.1 LUNG NODULE: ICD-10-CM

## 2023-05-15 LAB — REF LAB TEST METHOD: NORMAL

## 2023-05-15 PROCEDURE — 71250 CT THORAX DX C-: CPT

## 2023-05-26 RX ORDER — SODIUM, POTASSIUM,MAG SULFATES 17.5-3.13G
2 SOLUTION, RECONSTITUTED, ORAL ORAL TAKE AS DIRECTED
Qty: 354 ML | Refills: 0 | Status: SHIPPED | OUTPATIENT
Start: 2023-05-26

## 2023-06-02 ENCOUNTER — OUTSIDE FACILITY SERVICE (OUTPATIENT)
Dept: GASTROENTEROLOGY | Facility: CLINIC | Age: 73
End: 2023-06-02
Payer: MEDICARE

## 2023-06-02 PROCEDURE — 45385 COLONOSCOPY W/LESION REMOVAL: CPT | Performed by: INTERNAL MEDICINE

## 2023-07-06 PROBLEM — I48.91 ATRIAL FIBRILLATION: Status: ACTIVE | Noted: 2023-07-06

## 2023-07-25 ENCOUNTER — OFFICE VISIT (OUTPATIENT)
Dept: CARDIOLOGY | Facility: CLINIC | Age: 73
End: 2023-07-25
Payer: MEDICARE

## 2023-07-25 VITALS
HEART RATE: 85 BPM | OXYGEN SATURATION: 96 % | SYSTOLIC BLOOD PRESSURE: 110 MMHG | WEIGHT: 139.6 LBS | DIASTOLIC BLOOD PRESSURE: 64 MMHG | BODY MASS INDEX: 20.68 KG/M2 | HEIGHT: 69 IN

## 2023-07-25 DIAGNOSIS — R94.31 ABNORMAL EKG: ICD-10-CM

## 2023-07-25 DIAGNOSIS — I48.0 PAF (PAROXYSMAL ATRIAL FIBRILLATION): Primary | ICD-10-CM

## 2023-07-25 DIAGNOSIS — E78.00 HYPERCHOLESTEREMIA: ICD-10-CM

## 2023-07-25 PROCEDURE — 93000 ELECTROCARDIOGRAM COMPLETE: CPT | Performed by: PHYSICIAN ASSISTANT

## 2023-07-25 PROCEDURE — 99213 OFFICE O/P EST LOW 20 MIN: CPT | Performed by: PHYSICIAN ASSISTANT

## 2023-07-25 RX ORDER — BISOPROLOL FUMARATE 5 MG/1
5 TABLET, FILM COATED ORAL DAILY
Qty: 30 TABLET | Refills: 6 | Status: SHIPPED | OUTPATIENT
Start: 2023-07-25
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
## 2023-09-19 ENCOUNTER — OFFICE VISIT (OUTPATIENT)
Dept: PULMONOLOGY | Facility: CLINIC | Age: 73
End: 2023-09-19
Payer: MEDICARE

## 2023-09-19 VITALS
TEMPERATURE: 97.5 F | BODY MASS INDEX: 20.63 KG/M2 | HEIGHT: 69 IN | SYSTOLIC BLOOD PRESSURE: 94 MMHG | OXYGEN SATURATION: 97 % | DIASTOLIC BLOOD PRESSURE: 62 MMHG | HEART RATE: 74 BPM | WEIGHT: 139.3 LBS

## 2023-09-19 DIAGNOSIS — R91.1 PULMONARY NODULE: Primary | ICD-10-CM

## 2023-09-19 PROCEDURE — 1160F RVW MEDS BY RX/DR IN RCRD: CPT | Performed by: NURSE PRACTITIONER

## 2023-09-19 PROCEDURE — 1159F MED LIST DOCD IN RCRD: CPT | Performed by: NURSE PRACTITIONER

## 2023-09-19 PROCEDURE — 99213 OFFICE O/P EST LOW 20 MIN: CPT | Performed by: NURSE PRACTITIONER

## 2023-09-19 NOTE — PROGRESS NOTES
"[facility] Pulmonary Follow up    CHIEF COMPLAINT    No complaints    HISTORY OF PRESENT ILLNESS    Gemma Cabello is a 73 y.o.female here today for follow-up.  She was last seen in the office by Dr. Lott in May.  She denies any respiratory illnesses since her last appointment.  She has had an ablation performed and has noticed that her breathing has significantly improved.    She was referred to our office for a pulmonary nodule that was found incidentally on a CTA of the chest.  Repeat CT scan was performed after this visit and she is here today to discuss the results.    She denies any sputum production or hemoptysis.  Denies any fever, chills or night sweats.  She denies any chest pain or palpitations.  She denies any lower extremity edema or calf tenderness.    She has minimal shortness of breath with exertion.  She denies any sleeping difficulties.    She denies any reflux symptoms.    She is a lifetime non-smoker.    Patient Active Problem List   Diagnosis    PAF (paroxysmal atrial fibrillation)    Abnormal EKG    Hypercholesteremia    Murmur, cardiac    Atrial fibrillation    Pulmonary nodule       Allergies   Allergen Reactions    Iodine Other (See Comments)     \"open sores\"    Levaquin [Levofloxacin] Shortness Of Breath and Swelling    Multaq [Dronedarone] Shortness Of Breath, Rash and Dizziness    Rythmol [Propafenone Hcl] Nausea And Vomiting, Other (See Comments) and Confusion     Syncope    Betadine [Povidone Iodine] Other (See Comments)     SORES, BLISTERS     Metoprolol Dizziness     INCREASED HEART RATE    Formaldehyde Rash    Nickel Rash    Propafenone Nausea And Vomiting    Quinolones Nausea And Vomiting    Shellfish-Derived Products Rash    Sulfa Antibiotics Nausea And Vomiting    Zyrtec [Cetirizine] Nausea And Vomiting       Current Outpatient Medications:     apixaban (ELIQUIS) 5 MG tablet tablet, Take 1 tablet by mouth 2 (Two) Times a Day., Disp: 60 tablet, Rfl: 3    bisoprolol (ZEBeta) " "5 MG tablet, Take 1 tablet by mouth Daily., Disp: 30 tablet, Rfl: 6    multivitamin with minerals tablet tablet, Take 1 tablet by mouth Daily., Disp: , Rfl:   MEDICATION LIST AND ALLERGIES REVIEWED.    Social History     Tobacco Use    Smoking status: Never    Smokeless tobacco: Never   Vaping Use    Vaping Use: Never used   Substance Use Topics    Alcohol use: Yes     Comment: RARELY    Drug use: No       FAMILY AND SOCIAL HISTORY REVIEWED.    Review of Systems   Constitutional:  Negative for activity change, appetite change, fatigue, fever and unexpected weight change.   HENT:  Negative for congestion, postnasal drip, rhinorrhea, sinus pressure, sore throat and voice change.    Eyes:  Negative for visual disturbance.   Respiratory:  Positive for shortness of breath. Negative for cough, chest tightness and wheezing.    Cardiovascular:  Negative for chest pain, palpitations and leg swelling.   Gastrointestinal:  Negative for abdominal distention, abdominal pain, nausea and vomiting.   Endocrine: Negative for cold intolerance and heat intolerance.   Genitourinary:  Negative for difficulty urinating and urgency.   Musculoskeletal:  Negative for arthralgias, back pain and neck pain.   Skin:  Negative for color change and pallor.   Allergic/Immunologic: Negative for environmental allergies and food allergies.   Neurological:  Negative for dizziness, syncope, weakness and light-headedness.   Hematological:  Negative for adenopathy. Does not bruise/bleed easily.   Psychiatric/Behavioral:  Negative for agitation and behavioral problems.  .    BP 94/62 (BP Location: Right arm, Patient Position: Sitting, Cuff Size: Adult)   Pulse 74   Temp 97.5 °F (36.4 °C) (Infrared)   Ht 174 cm (68.5\")   Wt 63.2 kg (139 lb 4.8 oz)   SpO2 97% Comment: room air at rest  BMI 20.87 kg/m²     Immunization History   Administered Date(s) Administered    COVID-19 (MODERNA) BIVALENT 12+YRS 10/31/2022    Fluad Quad 65+ 10/15/2021, 10/31/2022 "    Fluzone High Dose =>65 Years (Vaxcare ONLY) 10/25/2019    Fluzone High-Dose 65+yrs 09/25/2020    Zostavax 10/04/2017       Physical Exam  Vitals and nursing note reviewed.   Constitutional:       Appearance: She is well-developed. She is not diaphoretic.   HENT:      Head: Normocephalic and atraumatic.   Eyes:      Pupils: Pupils are equal, round, and reactive to light.   Neck:      Thyroid: No thyromegaly.   Cardiovascular:      Rate and Rhythm: Normal rate and regular rhythm.      Heart sounds: Normal heart sounds. No murmur heard.    No friction rub. No gallop.   Pulmonary:      Effort: Pulmonary effort is normal. No respiratory distress.      Breath sounds: Normal breath sounds. No wheezing or rales.   Chest:      Chest wall: No tenderness.   Abdominal:      General: Bowel sounds are normal.      Palpations: Abdomen is soft.      Tenderness: There is no abdominal tenderness.   Musculoskeletal:         General: No swelling. Normal range of motion.      Cervical back: Normal range of motion and neck supple.   Lymphadenopathy:      Cervical: No cervical adenopathy.   Skin:     General: Skin is warm and dry.      Capillary Refill: Capillary refill takes less than 2 seconds.   Neurological:      Mental Status: She is alert and oriented to person, place, and time.   Psychiatric:         Behavior: Behavior normal.         RESULTS    PROBLEM LIST    Problem List Items Addressed This Visit          Pulmonary and Pneumonias    Pulmonary nodule - Primary    Relevant Orders    CT Chest Without Contrast         DISCUSSION    Mrs. Cabello was here for follow-up.  She seems be doing fairly well from pulmonary standpoint.  She has no pulmonary complaints in the office today.    She had a CT of the chest in May that showed a pulmonary nodule that was decreasing in size.  She did have COVID in December 2022.    We will go ahead and repeat a CT scan to make sure that the nodule is continuing to get smaller.  I will contact  her with the results over the phone.  If the nodule continues to be smaller or has resolved she will no longer need follow-up in our office.    We will plan to follow-up based on her CT scan findings.    I personally spent a total of 23 minutes on patient visit today including chart review, face to face with the patient obtaining the history and physical exam, review of pertinent images and tests, counseling and discussion and/or coordination of care as described above, and documentation.  Total time excludes time spent on other separate services such as performing procedures or test interpretation, if applicable.        Gifty Zuñiga, ADARSH  09/19/202315:16 EDT  Electronically signed     Please note that portions of this note were completed with a voice recognition program.        CC: Mariajose Zamora MD

## 2023-10-09 ENCOUNTER — HOSPITAL ENCOUNTER (OUTPATIENT)
Dept: CT IMAGING | Facility: HOSPITAL | Age: 73
Discharge: HOME | End: 2023-10-09
Admitting: NURSE PRACTITIONER
Payer: MEDICARE

## 2023-10-09 DIAGNOSIS — R91.1 PULMONARY NODULE: ICD-10-CM

## 2023-10-09 PROCEDURE — 71250 CT THORAX DX C-: CPT

## 2023-10-20 ENCOUNTER — OFFICE VISIT (OUTPATIENT)
Dept: CARDIOLOGY | Facility: CLINIC | Age: 73
End: 2023-10-20
Payer: MEDICARE

## 2023-10-20 VITALS
HEART RATE: 75 BPM | SYSTOLIC BLOOD PRESSURE: 110 MMHG | OXYGEN SATURATION: 98 % | DIASTOLIC BLOOD PRESSURE: 70 MMHG | HEIGHT: 69 IN | BODY MASS INDEX: 20.59 KG/M2 | WEIGHT: 139 LBS

## 2023-10-20 DIAGNOSIS — R91.1 PULMONARY NODULE: ICD-10-CM

## 2023-10-20 DIAGNOSIS — I71.21 ANEURYSM OF ASCENDING AORTA WITHOUT RUPTURE: ICD-10-CM

## 2023-10-20 DIAGNOSIS — I48.0 PAROXYSMAL ATRIAL FIBRILLATION: Primary | ICD-10-CM
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
## 2023-10-23 ENCOUNTER — TELEPHONE (OUTPATIENT)
Dept: PULMONOLOGY | Facility: CLINIC | Age: 73
End: 2023-10-23
Payer: MEDICARE

## 2023-10-23 ENCOUNTER — PREP FOR SURGERY (OUTPATIENT)
Dept: OTHER | Facility: HOSPITAL | Age: 73
End: 2023-10-23
Payer: MEDICARE

## 2023-10-23 DIAGNOSIS — R91.1 PULMONARY NODULE: Primary | ICD-10-CM

## 2023-10-23 RX ORDER — APIXABAN 5 MG/1
5 TABLET, FILM COATED ORAL 2 TIMES DAILY
Qty: 60 TABLET | Refills: 3 | OUTPATIENT
Start: 2023-10-23

## 2023-10-23 NOTE — TELEPHONE ENCOUNTER
I contacted Ms. Cabello about her enlarging pulmonary nodule on her CT scan that she had recently.  We will proceed with a needle biopsy.  I have placed the orders today and hopefully will get this done in the next couple of weeks.  I will contact her with the results.    We did discuss the procedure over the phone today and she is agreeable to proceed.

## 2023-11-06 ENCOUNTER — TELEPHONE (OUTPATIENT)
Dept: INFUSION THERAPY | Facility: HOSPITAL | Age: 73
End: 2023-11-06
Payer: MEDICARE

## 2023-11-06 NOTE — TELEPHONE ENCOUNTER
Pt contacted as pre-procedure phone call prior to planned Lung biopsy for 11/8/23. Reviewed with patient arrival time, location, nothing to eat or drink by mouth, okay to take morning medications the day of procedure with a small sip of water, last dose of eliquis was 11/1/23,  needed, reviewed procedure instructions and allowed time for questions, and reviewed home medications, allergies, and medical history.

## 2023-11-08 ENCOUNTER — HOSPITAL ENCOUNTER (OUTPATIENT)
Dept: CT IMAGING | Facility: HOSPITAL | Age: 73
Discharge: HOME | End: 2023-11-08
Payer: MEDICARE

## 2023-11-08 ENCOUNTER — HOSPITAL ENCOUNTER (OUTPATIENT)
Dept: GENERAL RADIOLOGY | Facility: HOSPITAL | Age: 73
Discharge: HOME | End: 2023-11-08
Payer: MEDICARE

## 2023-11-08 VITALS
WEIGHT: 140 LBS | TEMPERATURE: 97.1 F | HEART RATE: 69 BPM | DIASTOLIC BLOOD PRESSURE: 71 MMHG | SYSTOLIC BLOOD PRESSURE: 103 MMHG | OXYGEN SATURATION: 97 % | RESPIRATION RATE: 16 BRPM | HEIGHT: 68 IN | BODY MASS INDEX: 21.22 KG/M2

## 2023-11-08 DIAGNOSIS — R91.1 PULMONARY NODULE: ICD-10-CM

## 2023-11-08 LAB
BASOPHILS # BLD AUTO: 0.04 10*3/MM3 (ref 0–0.2)
BASOPHILS NFR BLD AUTO: 0.9 % (ref 0–1.5)
DEPRECATED RDW RBC AUTO: 44.7 FL (ref 37–54)
EOSINOPHIL # BLD AUTO: 0.3 10*3/MM3 (ref 0–0.4)
EOSINOPHIL NFR BLD AUTO: 6.9 % (ref 0.3–6.2)
ERYTHROCYTE [DISTWIDTH] IN BLOOD BY AUTOMATED COUNT: 12.9 % (ref 12.3–15.4)
HCT VFR BLD AUTO: 38.5 % (ref 34–46.6)
HGB BLD-MCNC: 12.7 G/DL (ref 12–15.9)
IMM GRANULOCYTES # BLD AUTO: 0.01 10*3/MM3 (ref 0–0.05)
IMM GRANULOCYTES NFR BLD AUTO: 0.2 % (ref 0–0.5)
INR PPP: 1.03 (ref 0.89–1.12)
LYMPHOCYTES # BLD AUTO: 1.45 10*3/MM3 (ref 0.7–3.1)
LYMPHOCYTES NFR BLD AUTO: 33.3 % (ref 19.6–45.3)
MCH RBC QN AUTO: 31 PG (ref 26.6–33)
MCHC RBC AUTO-ENTMCNC: 33 G/DL (ref 31.5–35.7)
MCV RBC AUTO: 93.9 FL (ref 79–97)
MONOCYTES # BLD AUTO: 0.6 10*3/MM3 (ref 0.1–0.9)
MONOCYTES NFR BLD AUTO: 13.8 % (ref 5–12)
NEUTROPHILS NFR BLD AUTO: 1.96 10*3/MM3 (ref 1.7–7)
NEUTROPHILS NFR BLD AUTO: 44.9 % (ref 42.7–76)
NRBC BLD AUTO-RTO: 0 /100 WBC (ref 0–0.2)
PLATELET # BLD AUTO: 223 10*3/MM3 (ref 140–450)
PMV BLD AUTO: 9.9 FL (ref 6–12)
PROTHROMBIN TIME: 13.6 SECONDS (ref 12.2–14.5)
RBC # BLD AUTO: 4.1 10*6/MM3 (ref 3.77–5.28)
WBC NRBC COR # BLD: 4.36 10*3/MM3 (ref 3.4–10.8)

## 2023-11-08 PROCEDURE — 99152 MOD SED SAME PHYS/QHP 5/>YRS: CPT

## 2023-11-08 PROCEDURE — 87102 FUNGUS ISOLATION CULTURE: CPT | Performed by: NURSE PRACTITIONER

## 2023-11-08 PROCEDURE — 25010000002 LIDOCAINE 1 % SOLUTION: Performed by: RADIOLOGY

## 2023-11-08 PROCEDURE — 25010000002 MIDAZOLAM PER 1 MG: Performed by: RADIOLOGY

## 2023-11-08 PROCEDURE — 87070 CULTURE OTHR SPECIMN AEROBIC: CPT | Performed by: NURSE PRACTITIONER

## 2023-11-08 PROCEDURE — 87206 SMEAR FLUORESCENT/ACID STAI: CPT | Performed by: NURSE PRACTITIONER

## 2023-11-08 PROCEDURE — 87176 TISSUE HOMOGENIZATION CULTR: CPT | Performed by: NURSE PRACTITIONER

## 2023-11-08 PROCEDURE — 88342 IMHCHEM/IMCYTCHM 1ST ANTB: CPT | Performed by: NURSE PRACTITIONER

## 2023-11-08 PROCEDURE — 87205 SMEAR GRAM STAIN: CPT | Performed by: NURSE PRACTITIONER

## 2023-11-08 PROCEDURE — 88341 IMHCHEM/IMCYTCHM EA ADD ANTB: CPT | Performed by: NURSE PRACTITIONER

## 2023-11-08 PROCEDURE — 85610 PROTHROMBIN TIME: CPT | Performed by: RADIOLOGY

## 2023-11-08 PROCEDURE — 71045 X-RAY EXAM CHEST 1 VIEW: CPT

## 2023-11-08 PROCEDURE — 25010000002 FENTANYL CITRATE (PF) 50 MCG/ML SOLUTION: Performed by: RADIOLOGY

## 2023-11-08 PROCEDURE — 88305 TISSUE EXAM BY PATHOLOGIST: CPT | Performed by: NURSE PRACTITIONER

## 2023-11-08 PROCEDURE — 85025 COMPLETE CBC W/AUTO DIFF WBC: CPT | Performed by: RADIOLOGY

## 2023-11-08 PROCEDURE — 87116 MYCOBACTERIA CULTURE: CPT | Performed by: NURSE PRACTITIONER

## 2023-11-08 RX ORDER — FENTANYL CITRATE 50 UG/ML
INJECTION, SOLUTION INTRAMUSCULAR; INTRAVENOUS AS NEEDED
Status: COMPLETED | OUTPATIENT
Start: 2023-11-08 | End: 2023-11-08

## 2023-11-08 RX ORDER — HYDROCODONE BITARTRATE AND ACETAMINOPHEN 5; 325 MG/1; MG/1
1 TABLET ORAL EVERY 4 HOURS PRN
Status: DISCONTINUED | OUTPATIENT
Start: 2023-11-08 | End: 2023-11-09 | Stop reason: HOSPADM

## 2023-11-08 RX ORDER — SODIUM CHLORIDE 0.9 % (FLUSH) 0.9 %
10 SYRINGE (ML) INJECTION EVERY 12 HOURS SCHEDULED
Status: DISCONTINUED | OUTPATIENT
Start: 2023-11-08 | End: 2023-11-09 | Stop reason: HOSPADM

## 2023-11-08 RX ORDER — MIDAZOLAM HYDROCHLORIDE 1 MG/ML
INJECTION INTRAMUSCULAR; INTRAVENOUS AS NEEDED
Status: COMPLETED | OUTPATIENT
Start: 2023-11-08 | End: 2023-11-08

## 2023-11-08 RX ORDER — SODIUM CHLORIDE 0.9 % (FLUSH) 0.9 %
10 SYRINGE (ML) INJECTION AS NEEDED
Status: DISCONTINUED | OUTPATIENT
Start: 2023-11-08 | End: 2023-11-09 | Stop reason: HOSPADM

## 2023-11-08 RX ORDER — SODIUM CHLORIDE 9 MG/ML
40 INJECTION, SOLUTION INTRAVENOUS AS NEEDED
Status: DISCONTINUED | OUTPATIENT
Start: 2023-11-08 | End: 2023-11-09 | Stop reason: HOSPADM

## 2023-11-08 RX ORDER — LIDOCAINE HYDROCHLORIDE 10 MG/ML
20 INJECTION, SOLUTION INFILTRATION; PERINEURAL ONCE
Status: COMPLETED | OUTPATIENT
Start: 2023-11-08 | End: 2023-11-08

## 2023-11-08 RX ORDER — MIDAZOLAM HYDROCHLORIDE 1 MG/ML
INJECTION INTRAMUSCULAR; INTRAVENOUS
Status: DISPENSED
Start: 2023-11-08 | End: 2023-11-08

## 2023-11-08 RX ORDER — FENTANYL CITRATE 50 UG/ML
INJECTION, SOLUTION INTRAMUSCULAR; INTRAVENOUS
Status: DISPENSED
Start: 2023-11-08 | End: 2023-11-08

## 2023-11-08 RX ORDER — MORPHINE SULFATE 2 MG/ML
2 INJECTION, SOLUTION INTRAMUSCULAR; INTRAVENOUS
Status: DISCONTINUED | OUTPATIENT
Start: 2023-11-08 | End: 2023-11-09 | Stop reason: HOSPADM

## 2023-11-08 RX ADMIN — MIDAZOLAM HYDROCHLORIDE 1 MG: 1 INJECTION, SOLUTION INTRAMUSCULAR; INTRAVENOUS at 09:00

## 2023-11-08 RX ADMIN — MIDAZOLAM HYDROCHLORIDE 1 MG: 1 INJECTION, SOLUTION INTRAMUSCULAR; INTRAVENOUS at 09:02

## 2023-11-08 RX ADMIN — FENTANYL CITRATE 50 MCG: 50 INJECTION, SOLUTION INTRAMUSCULAR; INTRAVENOUS at 09:07

## 2023-11-08 RX ADMIN — FENTANYL CITRATE 50 MCG: 50 INJECTION, SOLUTION INTRAMUSCULAR; INTRAVENOUS at 09:00

## 2023-11-08 RX ADMIN — LIDOCAINE HYDROCHLORIDE 10 ML: 10 INJECTION, SOLUTION INFILTRATION; PERINEURAL at 08:57

## 2023-11-08 RX ADMIN — MIDAZOLAM HYDROCHLORIDE 1 MG: 1 INJECTION, SOLUTION INTRAMUSCULAR; INTRAVENOUS at 09:07

## 2023-11-08 RX ADMIN — FENTANYL CITRATE 50 MCG: 50 INJECTION, SOLUTION INTRAMUSCULAR; INTRAVENOUS at 09:02
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
## 2023-11-08 NOTE — DISCHARGE INSTRUCTIONS
Avoid any strenuous activities, pulling, tugging, straining or lifting anything over 10 pounds for the next 5 days.    You may remove the bandaid tomorrow and shower; but you will need to avoid tub baths or any situation where your are submersed in water for the next 4 or 5 days to avoid the risk of infection.     DO NOT DRIVE ON THE DAY OF YOUR PROCEDURE.    If you have any problems or concern please contact your physician's office.

## 2023-11-09 ENCOUNTER — TELEPHONE (OUTPATIENT)
Dept: INFUSION THERAPY | Facility: HOSPITAL | Age: 73
End: 2023-11-09
Payer: MEDICARE

## 2023-11-10 ENCOUNTER — TELEPHONE (OUTPATIENT)
Dept: PULMONOLOGY | Facility: CLINIC | Age: 73
End: 2023-11-10
Payer: MEDICARE

## 2023-11-10 DIAGNOSIS — R91.1 PULMONARY NODULE: Primary | ICD-10-CM

## 2023-11-10 LAB
CYTO UR: NORMAL
LAB AP CASE REPORT: NORMAL
LAB AP CLINICAL INFORMATION: NORMAL
LAB AP DIAGNOSIS COMMENT: NORMAL
PATH REPORT.FINAL DX SPEC: NORMAL
PATH REPORT.GROSS SPEC: NORMAL

## 2023-11-10 NOTE — PROGRESS NOTES
I contacted Ms. Cabello on her biopsy results the pathology showed benign alveolar tissue and pulmonary macrophages, no tumor or granuloma identified, her tissue growth was negative and her fungal cultures are negative to date.  We will continue to follow her fungal cultures for 6 weeks.    We did discuss proceeding with a PET scan to rule out any other involvement and she is agreeable to proceed with this.  We will get the PET scan and then proceed based on the results of the PET.  If the PET is negative we will repeat a CT scan in 3 months.    I will get her set up with a follow-up with Dr. Lott as well.

## 2023-11-10 NOTE — TELEPHONE ENCOUNTER
Pt called asking for results of her biopsy performed 11/8/23. Transferred to your vc and told pt to leave a message and that I would send one to you as well. Told pt we'd get back to her. Pt verbalized understanding.

## 2023-11-11 LAB
BACTERIA SPEC AEROBE CULT: NORMAL
GRAM STN SPEC: NORMAL

## 2023-11-15 LAB
FUNGUS WND CULT: NORMAL
MYCOBACTERIUM SPEC CULT: NORMAL
NIGHT BLUE STAIN TISS: NORMAL

## 2023-11-22 LAB
FUNGUS WND CULT: NORMAL
MYCOBACTERIUM SPEC CULT: NORMAL
NIGHT BLUE STAIN TISS: NORMAL

## 2023-11-29 LAB
FUNGUS WND CULT: NORMAL
MYCOBACTERIUM SPEC CULT: NORMAL
NIGHT BLUE STAIN TISS: NORMAL

## 2023-12-06 LAB
FUNGUS WND CULT: NORMAL
MYCOBACTERIUM SPEC CULT: NORMAL
NIGHT BLUE STAIN TISS: NORMAL

## 2023-12-11 ENCOUNTER — HOSPITAL ENCOUNTER (OUTPATIENT)
Dept: PET IMAGING | Facility: HOSPITAL | Age: 73
Discharge: HOME | End: 2023-12-11
Payer: MEDICARE

## 2023-12-11 DIAGNOSIS — R91.1 PULMONARY NODULE: ICD-10-CM

## 2023-12-11 LAB — GLUCOSE BLDC GLUCOMTR-MCNC: 93 MG/DL (ref 70–130)

## 2023-12-11 PROCEDURE — 78815 PET IMAGE W/CT SKULL-THIGH: CPT

## 2023-12-11 PROCEDURE — A9552 F18 FDG: HCPCS | Performed by: NURSE PRACTITIONER

## 2023-12-11 PROCEDURE — 82948 REAGENT STRIP/BLOOD GLUCOSE: CPT

## 2023-12-11 PROCEDURE — 0 FLUDEOXYGLUCOSE F18 SOLUTION: Performed by: NURSE PRACTITIONER

## 2023-12-11 RX ADMIN — FLUDEOXYGLUCOSE F 18 1 DOSE: 200 INJECTION, SOLUTION INTRAVENOUS at 12:39

## 2023-12-13 ENCOUNTER — TELEPHONE (OUTPATIENT)
Dept: PULMONOLOGY | Facility: CLINIC | Age: 73
End: 2023-12-13
Payer: MEDICARE

## 2023-12-13 DIAGNOSIS — R91.1 PULMONARY NODULE: Primary | ICD-10-CM

## 2023-12-13 LAB
FUNGUS WND CULT: NORMAL
MYCOBACTERIUM SPEC CULT: NORMAL
NIGHT BLUE STAIN TISS: NORMAL

## 2023-12-13 NOTE — TELEPHONE ENCOUNTER
I called Mrs. Cabello about her PET scan results.  The solid nodule in the left lower lobe has a low FDG uptake.  Some of her lymph nodes are positive mildly.  We have already proceeded with a biopsy which was negative for malignancy and we are still awaiting the final cultures of her AFB.    We will plan on having a repeat CT scan in 3 months and have her follow-up in the office in March.

## 2023-12-20 LAB
FUNGUS WND CULT: NORMAL
MYCOBACTERIUM SPEC CULT: NORMAL
NIGHT BLUE STAIN TISS: NORMAL

## 2024-03-01 ENCOUNTER — HOSPITAL ENCOUNTER (OUTPATIENT)
Dept: CT IMAGING | Facility: HOSPITAL | Age: 74
Discharge: HOME | End: 2024-03-01
Payer: MEDICARE

## 2024-03-01 DIAGNOSIS — R91.1 PULMONARY NODULE: ICD-10-CM

## 2024-03-01 PROCEDURE — 71250 CT THORAX DX C-: CPT

## 2024-03-15 ENCOUNTER — OFFICE VISIT (OUTPATIENT)
Dept: CARDIOLOGY | Facility: CLINIC | Age: 74
End: 2024-03-15
Payer: MEDICARE

## 2024-03-15 VITALS
OXYGEN SATURATION: 96 % | HEART RATE: 87 BPM | WEIGHT: 140.2 LBS | HEIGHT: 69 IN | SYSTOLIC BLOOD PRESSURE: 124 MMHG | DIASTOLIC BLOOD PRESSURE: 80 MMHG | BODY MASS INDEX: 20.76 KG/M2

## 2024-03-15 DIAGNOSIS — I48.0 PAROXYSMAL ATRIAL FIBRILLATION: Primary | ICD-10-CM

## 2024-03-15 DIAGNOSIS — I71.21 ANEURYSM OF ASCENDING AORTA WITHOUT RUPTURE: ICD-10-CM

## 2024-03-15 DIAGNOSIS — R91.1 PULMONARY NODULE: ICD-10-CM

## 2024-03-15 PROBLEM — I71.9 AORTIC ANEURYSM: Status: ACTIVE | Noted: 2024-03-15

## 2024-03-15 RX ORDER — SENNOSIDES 8.6 MG
1300 CAPSULE ORAL EVERY 8 HOURS PRN
COMMUNITY

## 2024-03-15 RX ORDER — ASPIRIN 81 MG/1
81 TABLET ORAL DAILY
COMMUNITY
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
## 2024-03-26 ENCOUNTER — OFFICE VISIT (OUTPATIENT)
Dept: PULMONOLOGY | Facility: CLINIC | Age: 74
End: 2024-03-26
Payer: MEDICARE

## 2024-03-26 VITALS
HEART RATE: 76 BPM | WEIGHT: 144 LBS | DIASTOLIC BLOOD PRESSURE: 78 MMHG | OXYGEN SATURATION: 95 % | SYSTOLIC BLOOD PRESSURE: 108 MMHG | BODY MASS INDEX: 21.33 KG/M2 | HEIGHT: 69 IN | TEMPERATURE: 97.5 F

## 2024-03-26 DIAGNOSIS — R91.1 PULMONARY NODULE: Primary | ICD-10-CM

## 2024-03-26 DIAGNOSIS — I48.0 PAF (PAROXYSMAL ATRIAL FIBRILLATION): ICD-10-CM

## 2024-03-26 PROCEDURE — 99215 OFFICE O/P EST HI 40 MIN: CPT | Performed by: INTERNAL MEDICINE

## 2024-03-26 PROCEDURE — 1160F RVW MEDS BY RX/DR IN RCRD: CPT | Performed by: INTERNAL MEDICINE

## 2024-03-26 PROCEDURE — 1159F MED LIST DOCD IN RCRD: CPT | Performed by: INTERNAL MEDICINE

## 2024-03-26 NOTE — PROGRESS NOTES
"Follow Up Office Note       Patient Name: Gemma Cabello    Referring Physician: No ref. provider found    Chief Complaint:    Chief Complaint   Patient presents with    Lung Nodule     Follow up       History of Present Illness: Gemma Cabello is a 73 y.o. female who is here today to follow-up care with Pulmonary.  Patient is a past medical history significant for atrial fibrillation, and hyperlipidemia.  He was referred to pulmonary originally for pulmonary nodule follow-up with Dr. Lott.  Underwent a biopsy in November 2024, which showed no signs of malignancy.  Then underwent a PET scan showing low-level PET positivity in December 2023.  Here today to follow-up.  Patient currently doing well denies any chest pain, nausea, fever, or chills.    Review of Systems:   Review of Systems   Constitutional:  Negative for chills, fatigue and fever.   HENT:  Negative for congestion and voice change.    Eyes:  Negative for blurred vision.   Respiratory:  Negative for cough, shortness of breath and wheezing.    Cardiovascular:  Negative for chest pain.   Skin:  Negative for dry skin.   Hematological:  Negative for adenopathy.   Psychiatric/Behavioral:  Negative for agitation and depressed mood.        The following portions of the patient's history were reviewed and updated as appropriate: allergies, current medications, past family history, past medical history, past social history, past surgical history and problem list.    Physical Exam:  Vital Signs:   Vitals:    03/26/24 1304   BP: 108/78   BP Location: Left arm   Patient Position: Sitting   Cuff Size: Adult   Pulse: 76   Temp: 97.5 °F (36.4 °C)   SpO2: 95%  Comment: Room air at rest   Weight: 65.3 kg (144 lb)   Height: 174 cm (68.5\")       Physical Exam  Vitals and nursing note reviewed.   Constitutional:       General: She is not in acute distress.     Appearance: She is well-developed and normal weight. She is not ill-appearing or toxic-appearing. "   HENT:      Head: Normocephalic and atraumatic.   Cardiovascular:      Rate and Rhythm: Normal rate and regular rhythm.      Pulses: Normal pulses.      Heart sounds: Normal heart sounds. No murmur heard.     No friction rub. No gallop.   Pulmonary:      Effort: Pulmonary effort is normal. No respiratory distress.      Breath sounds: Normal breath sounds. No wheezing, rhonchi or rales.   Musculoskeletal:      Right lower leg: No edema.      Left lower leg: No edema.   Skin:     General: Skin is warm and dry.   Neurological:      Mental Status: She is alert and oriented to person, place, and time.         Immunization History   Administered Date(s) Administered    ABRYSVO (RSV, 60+ or pregnant women 32-36 wks) 01/09/2024    COVID-19 (MODERNA) BIVALENT 12+YRS 10/31/2022    COVID-19 (PFIZER) Purple Cap Monovalent 02/28/2021, 03/27/2021, 11/30/2021    Fluad Quad 65+ 10/15/2021, 10/31/2022, 01/09/2024    Fluzone High Dose =>65 Years ([employer] ONLY) 10/25/2019    Fluzone High-Dose 65+yrs 09/25/2020    Zostavax 10/04/2017       Results Review:   - I personally reviewed the pts imaging from multiple CT scans of the chest including a PET scan dating from April 2023 through March 2024 showed a nodule in the left lower lobe irregular shaped, range between 15 mm and 17 mm.  PET scan also showed that the area in the left lower lobe had no significant PET positivity but there was some very mild PET positivity in the left hilum.  - I personally reviewed the pts biopsy results from 11/8/2023 showed benign alveolar tissue and pulmonary macrophages with focal nonspecific chronic inflammation.  - I personally reviewed the patient's culture results from 11/8/2023, AFB, fungal and tissue culture all negative.  - I personally reviewed the pts Echo report from 7/7/2023 showed a EF of 56 to 60%.  - I personally reviewed the pts chart with regards to previous pulmonary evaluation in addition to biopsies and cardiology evaluation for  A-fib.    Assessment / Plan:   Diagnoses and all orders for this visit:    1. Pulmonary nodule (Primary)  2. PAF (paroxysmal atrial fibrillation)  -I had a long conversation with the patient today about her possible options.  The PET scan did show some positivity in the left hilum that very easily could be malignancy, but the lesion itself was PET negative.  The other possibility is that this is some type of resolving infection.  Explained to the patient without the PET scan I would recommend ongoing follow-up as there has not been any significant change in the lesion over the last couple of months.  But with the PET scan being positive I think further evaluation is needed.  We discussed bronchoscopy with EBUS and navigation on today's visit but she wants to hold on further biopsies for now.  Therefore we will plan for repeat CT scan with contrast this time to better delineate the left hilum, and plan for that in 3 months.  If there is any growth and I would recommend biopsy.  She verbalized understanding agree with plan.    Level of service justified based on 52 minutes spent in patient care on this date of service including, but not limited to: preparing to see the patient, obtaining and/or reviewing history, performing medically appropriate examination, ordering tests/medicine/procedures, independently interpreting results, documenting clinical information in EHR, and counseling/education of patient/family/caregiver (Excluding time spent on other separate services such as performing procedures or test interpretation, if applicable). (Level 4 30-39 minutes; Level 5 40-54 minutes)    Follow Up:   Return in about 3 months (around 6/26/2024) for CT Chest with next visit.       KORIN Vega,   Pulmonary and Critical Care Medicine  Note Electronically Signed    Part of this note may be an electronic transcription/translation of spoken language to printed text using the Dragon Dictation System.

## 2024-07-03 ENCOUNTER — HOSPITAL ENCOUNTER (OUTPATIENT)
Dept: CT IMAGING | Facility: HOSPITAL | Age: 74
Discharge: HOME | End: 2024-07-03
Payer: MEDICARE

## 2024-07-03 DIAGNOSIS — R91.1 PULMONARY NODULE: ICD-10-CM

## 2024-07-03 DIAGNOSIS — Z91.041 CONTRAST MEDIA ALLERGY: Primary | ICD-10-CM

## 2024-07-03 RX ORDER — DIPHENHYDRAMINE HCL 25 MG
50 TABLET ORAL
OUTPATIENT
Start: 2024-07-03 | End: 2024-07-03

## 2024-07-03 RX ORDER — PREDNISONE 50 MG/1
50 TABLET ORAL TAKE AS DIRECTED
Qty: 3 TABLET | Refills: 0 | Status: SHIPPED | OUTPATIENT
Start: 2024-07-03

## 2024-07-03 RX ORDER — DIPHENHYDRAMINE HYDROCHLORIDE 50 MG/ML
50 INJECTION INTRAMUSCULAR; INTRAVENOUS
OUTPATIENT
Start: 2024-07-03 | End: 2024-07-03

## 2024-07-12 DIAGNOSIS — R91.1 PULMONARY NODULE: Primary | ICD-10-CM

## 2024-07-18 ENCOUNTER — HOSPITAL ENCOUNTER (OUTPATIENT)
Facility: HOSPITAL | Age: 74
Discharge: HOME | End: 2024-07-18
Admitting: INTERNAL MEDICINE
Payer: MEDICARE

## 2024-07-18 ENCOUNTER — OFFICE VISIT (OUTPATIENT)
Dept: PULMONOLOGY | Facility: CLINIC | Age: 74
End: 2024-07-18
Payer: MEDICARE

## 2024-07-18 VITALS
HEART RATE: 84 BPM | SYSTOLIC BLOOD PRESSURE: 110 MMHG | OXYGEN SATURATION: 96 % | HEIGHT: 69 IN | TEMPERATURE: 98.6 F | DIASTOLIC BLOOD PRESSURE: 70 MMHG | BODY MASS INDEX: 22.07 KG/M2 | WEIGHT: 149 LBS

## 2024-07-18 DIAGNOSIS — R91.1 PULMONARY NODULE: ICD-10-CM

## 2024-07-18 DIAGNOSIS — I48.0 PAF (PAROXYSMAL ATRIAL FIBRILLATION): ICD-10-CM

## 2024-07-18 DIAGNOSIS — R91.1 PULMONARY NODULE: Primary | ICD-10-CM

## 2024-07-18 PROCEDURE — 71260 CT THORAX DX C+: CPT

## 2024-07-18 PROCEDURE — 25510000001 IOPAMIDOL 61 % SOLUTION: Performed by: INTERNAL MEDICINE

## 2024-07-18 PROCEDURE — 99214 OFFICE O/P EST MOD 30 MIN: CPT | Performed by: INTERNAL MEDICINE

## 2024-07-18 RX ADMIN — IOPAMIDOL 75 ML: 612 INJECTION, SOLUTION INTRAVENOUS at 11:38

## 2024-07-18 NOTE — PROGRESS NOTES
"Follow Up Office Note       Patient Name: Gemma Cabello    Referring Physician: No ref. provider found    Chief Complaint:    Chief Complaint   Patient presents with    Post CT     F/U       History of Present Illness: Gemma Cabello is a 74 y.o. female who is here today to follow-up care with Pulmonary.  Patient is a past medical history significant for atrial fibrillation, and hyperlipidemia.  He was referred to pulmonary originally for pulmonary nodule follow-up with Dr. Lott.  Underwent a biopsy in November 2024, which showed no signs of malignancy.  Then underwent a PET scan showing low-level PET positivity in December 2023.  Here today to follow-up.  Patient doing well no pulmonary complaints at this time.    Review of Systems:   Review of Systems   Constitutional:  Negative for chills, fatigue and fever.   HENT:  Negative for congestion and voice change.    Eyes:  Negative for blurred vision.   Respiratory:  Negative for cough, shortness of breath and wheezing.    Cardiovascular:  Negative for chest pain.   Skin:  Negative for dry skin.   Hematological:  Negative for adenopathy.   Psychiatric/Behavioral:  Negative for agitation and depressed mood.        The following portions of the patient's history were reviewed and updated as appropriate: allergies, current medications, past family history, past medical history, past social history, past surgical history and problem list.    Physical Exam:  Vital Signs:   Vitals:    07/18/24 1355   BP: 110/70   Pulse: 84   Temp: 98.6 °F (37 °C)   TempSrc: Infrared   SpO2: 96%  Comment: resting room air   Weight: 67.6 kg (149 lb)   Height: 174 cm (68.5\")         Physical Exam  Vitals and nursing note reviewed.   Constitutional:       General: She is not in acute distress.     Appearance: She is well-developed and normal weight. She is not ill-appearing or toxic-appearing.   HENT:      Head: Normocephalic and atraumatic.   Cardiovascular:      Rate and " Rhythm: Normal rate and regular rhythm.      Pulses: Normal pulses.      Heart sounds: Normal heart sounds. No murmur heard.     No friction rub. No gallop.   Pulmonary:      Effort: Pulmonary effort is normal. No respiratory distress.      Breath sounds: Normal breath sounds. No wheezing, rhonchi or rales.   Musculoskeletal:      Right lower leg: No edema.      Left lower leg: No edema.   Skin:     General: Skin is warm and dry.   Neurological:      Mental Status: She is alert and oriented to person, place, and time.         Immunization History   Administered Date(s) Administered    ABRYSVO (RSV, 60+ or pregnant women 32-36 wks) 01/09/2024    COVID-19 (MODERNA) BIVALENT 12+YRS 10/31/2022    COVID-19 (PFIZER) Purple Cap Monovalent 02/28/2021, 03/27/2021, 11/30/2021    Fluad Quad 65+ 10/15/2021, 10/31/2022, 01/09/2024    Fluzone High Dose =>65 Years ([employer] ONLY) 10/25/2019    Fluzone High-Dose 65+yrs 09/25/2020    Zostavax 10/04/2017       Results Review:   -I personally viewed the patient's CT scan of the chest from 7/19/2024, the left lower lobe 1.9 cm nodule currently stable in size.  - multiple CT scans of the chest including a PET scan dating from April 2023 through March 2024 showed a nodule in the left lower lobe irregular shaped, range between 15 mm and 17 mm.  PET scan also showed that the area in the left lower lobe had no significant PET positivity but there was some very mild PET positivity in the left hilum.  - biopsy results from 11/8/2023 showed benign alveolar tissue and pulmonary macrophages with focal nonspecific chronic inflammation.  - culture results from 11/8/2023, AFB, fungal and tissue culture all negative.  - Echo report from 7/7/2023 showed a EF of 56 to 60%.    Assessment / Plan:   Diagnoses and all orders for this visit:    1. Pulmonary nodule (Primary)  2. PAF (paroxysmal atrial fibrillation)  -With regards to the nodule left lower lobe it is currently stable.  I did evaluate the lymph  nodes on today's CT scan with contrast and there are no significant enlargement.  Even though there was some PET positivity in the area previously this may be spent clearing of an infection.  I want to continue to follow the nodule for total of 2 years so we will repeat a CT scan in 6 months.  We have not entirely rule out indolent cancer as of yet.  And then likely 1 year after that just to confirm 2 years of stability.  She verbalized understanding agree with the plan.    Follow Up:   Return in about 6 months (around 1/18/2025) for CT Chest with next visit.       KORIN Vega, DO  Pulmonary and Critical Care Medicine  Note Electronically Signed    Part of this note may be an electronic transcription/translation of spoken language to printed text using the Dragon Dictation System.

## 2024-07-23 ENCOUNTER — PATIENT MESSAGE (OUTPATIENT)
Dept: CARDIOLOGY | Facility: CLINIC | Age: 74
End: 2024-07-23
Payer: MEDICARE

## 2024-07-24 NOTE — TELEPHONE ENCOUNTER
Spoke to the patient who voiced her frustration with the office marking her as a no show when our office cancelled her appointment due to the [employer] software update that shut down our computers and charting system. I assured the patient that we would change this in her chart.     Patient also states she was having a severe reaction to prednisone that she received during a CT the day prior and she wanted Dr. West to address. I advised her that Dr. West is not the appropriate provider to address an allergic reaction and that her being told to go to an ER was an appropriate response. Patient has since been seen by her allergist as well.    Patient was offered a follow up appointment in either [place] or one of the two [place] locations. Pt chose [facility] on 8/6.

## 2024-08-06 ENCOUNTER — OFFICE VISIT (OUTPATIENT)
Dept: CARDIOLOGY | Facility: CLINIC | Age: 74
End: 2024-08-06
Payer: MEDICARE

## 2024-08-06 VITALS
BODY MASS INDEX: 22.02 KG/M2 | WEIGHT: 148.7 LBS | DIASTOLIC BLOOD PRESSURE: 84 MMHG | HEART RATE: 73 BPM | OXYGEN SATURATION: 97 % | HEIGHT: 69 IN | SYSTOLIC BLOOD PRESSURE: 112 MMHG

## 2024-08-06 DIAGNOSIS — I48.0 PAF (PAROXYSMAL ATRIAL FIBRILLATION): Primary | ICD-10-CM

## 2024-08-06 DIAGNOSIS — I71.21 ANEURYSM OF ASCENDING AORTA WITHOUT RUPTURE: ICD-10-CM

## 2024-08-06 DIAGNOSIS — T88.7XXA: ICD-10-CM
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
## 2024-08-08 PROBLEM — T88.7XXA: Status: ACTIVE | Noted: 2024-08-08

## 2024-08-12 ENCOUNTER — TELEPHONE (OUTPATIENT)
Dept: CARDIOLOGY | Facility: CLINIC | Age: 74
End: 2024-08-12
Payer: MEDICARE

## 2024-08-12 NOTE — TELEPHONE ENCOUNTER
----- Message from Cornelius Stockton sent at 8/8/2024  1:44 PM EDT -----  Regarding: Allergist  When you get back, can you put in a referral for this patient to Dr. Ken Whipple's ENT group for multiple drug hypersensitivity syndrome?

## 2025-05-06 ENCOUNTER — OFFICE VISIT (OUTPATIENT)
Dept: CARDIOLOGY | Facility: CLINIC | Age: 75
End: 2025-05-06
Payer: MEDICARE

## 2025-05-06 VITALS
HEART RATE: 75 BPM | HEIGHT: 69 IN | OXYGEN SATURATION: 98 % | DIASTOLIC BLOOD PRESSURE: 74 MMHG | SYSTOLIC BLOOD PRESSURE: 114 MMHG | WEIGHT: 147 LBS | BODY MASS INDEX: 21.77 KG/M2

## 2025-05-06 DIAGNOSIS — I48.0 PAF (PAROXYSMAL ATRIAL FIBRILLATION): Primary | ICD-10-CM

## 2025-05-06 DIAGNOSIS — I71.21 ANEURYSM OF ASCENDING AORTA WITHOUT RUPTURE: ICD-10-CM

## 2025-05-06 DIAGNOSIS — R01.1 MURMUR, CARDIAC: ICD-10-CM

## 2025-05-06 PROCEDURE — G2211 COMPLEX E/M VISIT ADD ON: HCPCS | Performed by: STUDENT IN AN ORGANIZED HEALTH CARE EDUCATION/TRAINING PROGRAM

## 2025-05-06 PROCEDURE — 99214 OFFICE O/P EST MOD 30 MIN: CPT | Performed by: STUDENT IN AN ORGANIZED HEALTH CARE EDUCATION/TRAINING PROGRAM

## 2025-05-06 RX ORDER — CICLOPIROX 80 MG/ML
SOLUTION TOPICAL
COMMUNITY
Start: 2025-01-22

## 2025-05-06 RX ORDER — BISOPROLOL FUMARATE 5 MG/1
5 TABLET, FILM COATED ORAL DAILY
Qty: 10 TABLET | Refills: 0 | Status: SHIPPED | OUTPATIENT
Start: 2025-05-06

## 2025-05-06 RX ORDER — TRETINOIN 1 MG/G
1 CREAM TOPICAL NIGHTLY
COMMUNITY
Start: 2025-01-23

## 2025-05-06 RX ORDER — MOMETASONE FUROATE 1 MG/G
1 CREAM TOPICAL DAILY
COMMUNITY
Start: 2025-03-05
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
## 2025-08-12 DIAGNOSIS — R91.1 PULMONARY NODULE: Primary | ICD-10-CM
